# Patient Record
Sex: FEMALE | Race: WHITE | Employment: FULL TIME | URBAN - METROPOLITAN AREA
[De-identification: names, ages, dates, MRNs, and addresses within clinical notes are randomized per-mention and may not be internally consistent; named-entity substitution may affect disease eponyms.]

---

## 2017-01-09 ENCOUNTER — OFFICE VISIT (OUTPATIENT)
Dept: NUTRITION/OBESITY MEDICINE | Facility: HOSPITAL | Age: 62
End: 2017-01-09
Attending: FAMILY MEDICINE
Payer: COMMERCIAL

## 2017-01-09 VITALS
WEIGHT: 186.06 LBS | DIASTOLIC BLOOD PRESSURE: 85 MMHG | OXYGEN SATURATION: 95 % | HEIGHT: 63 IN | HEART RATE: 93 BPM | SYSTOLIC BLOOD PRESSURE: 131 MMHG | BODY MASS INDEX: 32.97 KG/M2 | RESPIRATION RATE: 18 BRPM

## 2017-01-09 DIAGNOSIS — G47.33 OSA ON CPAP: ICD-10-CM

## 2017-01-09 DIAGNOSIS — Z99.89 OSA ON CPAP: ICD-10-CM

## 2017-01-09 DIAGNOSIS — Z51.81 ENCOUNTER FOR THERAPEUTIC DRUG MONITORING: ICD-10-CM

## 2017-01-09 DIAGNOSIS — R73.03 PRE-DIABETES: ICD-10-CM

## 2017-01-09 DIAGNOSIS — R63.2 BINGE EATING: ICD-10-CM

## 2017-01-09 DIAGNOSIS — E66.9 OBESITY (BMI 30-39.9): ICD-10-CM

## 2017-01-09 DIAGNOSIS — E55.9 VITAMIN D DEFICIENCY: ICD-10-CM

## 2017-01-09 DIAGNOSIS — E78.2 MIXED HYPERCHOLESTEROLEMIA AND HYPERTRIGLYCERIDEMIA: Primary | ICD-10-CM

## 2017-01-09 PROCEDURE — 99214 OFFICE O/P EST MOD 30 MIN: CPT | Performed by: INTERNAL MEDICINE

## 2017-01-09 NOTE — PROGRESS NOTES
[facility] AND WEIGHT LOSS CLINIC  [address] [place] [address]  Dept: [phone]  Loc: [phone]     Date:   2016    Patient:  Cristina Underwood  :      1955  MRN:      [id number]    Chief C weight: 189    Wt Readings from Last 3 Encounters:  01/09/17 : 186 lb 1 oz (84.397 kg)  12/13/16 : 183 lb (83.008 kg)  12/06/16 : 183 lb 9.6 oz (83.28 kg)      Patient Medications:      Current Outpatient Prescriptions:  Lisdexamfetamine Dimesylate (VYVANS Types: Cigarettes    Quit date: 01/12/1987    Smokeless tobacco: Never Used    Alcohol Use: Yes    Comment: 5 week    Drug Use: No    Sexual Activity: Not on file   Not on file  Other Topics Concern    Caffeine Concern Yes     Social History Narrative has a Food Journal?: yes   · Patient is reading nutrition labels? yes  · Average Caloric Intake:     · Average CHO Intake: 100  · Is patient exercising? no  · Type of exercise?      Eating Habits  · Patient states the following:  · Eats 3 meal(s) per day atraumatic, no cyanosis or edema  Pulses: 2+ and symmetric    ASSESSMENT     OBSTRUCTIVE SLEEP APNEA: The patient states her sleep apnea has been stable since the last clinic visit. There has not been any increase in hyper-somnolence.      Elebeatriz Knight

## 2017-02-07 ENCOUNTER — OFFICE VISIT (OUTPATIENT)
Dept: SURGERY | Facility: CLINIC | Age: 62
End: 2017-02-07

## 2017-02-07 VITALS
RESPIRATION RATE: 16 BRPM | SYSTOLIC BLOOD PRESSURE: 142 MMHG | BODY MASS INDEX: 32.28 KG/M2 | DIASTOLIC BLOOD PRESSURE: 89 MMHG | WEIGHT: 182.19 LBS | HEART RATE: 97 BPM | HEIGHT: 63 IN

## 2017-02-07 DIAGNOSIS — G47.33 OSA ON CPAP: ICD-10-CM

## 2017-02-07 DIAGNOSIS — Z51.81 ENCOUNTER FOR THERAPEUTIC DRUG MONITORING: ICD-10-CM

## 2017-02-07 DIAGNOSIS — R63.2 BINGE EATING: ICD-10-CM

## 2017-02-07 DIAGNOSIS — E78.2 MIXED HYPERCHOLESTEROLEMIA AND HYPERTRIGLYCERIDEMIA: Primary | ICD-10-CM

## 2017-02-07 DIAGNOSIS — R73.03 PRE-DIABETES: ICD-10-CM

## 2017-02-07 DIAGNOSIS — E66.9 OBESITY (BMI 30-39.9): ICD-10-CM

## 2017-02-07 DIAGNOSIS — Z99.89 OSA ON CPAP: ICD-10-CM

## 2017-02-07 DIAGNOSIS — E55.9 VITAMIN D DEFICIENCY: ICD-10-CM

## 2017-02-07 PROCEDURE — 99213 OFFICE O/P EST LOW 20 MIN: CPT | Performed by: INTERNAL MEDICINE

## 2017-02-07 NOTE — PROGRESS NOTES
[facility] AND WEIGHT LOSS CLINIC  [address] [place] [address]  Dept: [phone]  Loc: [phone]     Date:   2016    Patient:  Jose Crawford  :      1955  MRN:      [id number]    Chief C lb 3.2 oz  01/24/17 : 186 lb  01/09/17 : 186 lb 1 oz      Patient Medications:      Current Outpatient Prescriptions:  Lisdexamfetamine Dimesylate (VYVANSE) 50 MG Oral Cap Take 1 capsule (50 mg total) by mouth every morning.  Disp: 30 capsule Rfl: 0   MONTE Concern Yes     Social History Narrative     Surgical History:        Past Surgical History    NASAL SCOPY,EXPLOR FRONTAL SINUS  10/15/09    Comment Performed by Timbo Silverio at [address] [place] [address]  10/15/09    Comme the following:  · Eats 3 meal(s) per day  · Length of time it takes to consume a meal:  20  · # of snacks per day: 1 Type of snacks:  Almonds, apple  · Amount of soda consumption per day:  diet  · Amount of water (in ounces) per day:  64  · Drinking betwee hyper-somnolence. HYPERCHOLESTEROLEMIA:  The patient states that her cholesterol has been well controlled on her current diet.       Lab Results  Component Value Date/Time   CHOLEST 239* 11/09/2016 09:21 AM   * 11/09/2016 09:21 AM   HDL 72 11/09/

## 2017-02-15 PROCEDURE — 84439 ASSAY OF FREE THYROXINE: CPT | Performed by: FAMILY MEDICINE

## 2017-02-15 PROCEDURE — 84443 ASSAY THYROID STIM HORMONE: CPT | Performed by: FAMILY MEDICINE

## 2017-02-15 PROCEDURE — 36415 COLL VENOUS BLD VENIPUNCTURE: CPT | Performed by: FAMILY MEDICINE

## 2017-02-15 PROCEDURE — 84480 ASSAY TRIIODOTHYRONINE (T3): CPT | Performed by: FAMILY MEDICINE

## 2017-03-09 ENCOUNTER — OFFICE VISIT (OUTPATIENT)
Dept: SURGERY | Facility: CLINIC | Age: 62
End: 2017-03-09

## 2017-03-09 VITALS
OXYGEN SATURATION: 95 % | SYSTOLIC BLOOD PRESSURE: 138 MMHG | HEART RATE: 94 BPM | DIASTOLIC BLOOD PRESSURE: 95 MMHG | RESPIRATION RATE: 16 BRPM | HEIGHT: 63.5 IN | BODY MASS INDEX: 31.05 KG/M2 | WEIGHT: 177.44 LBS

## 2017-03-09 DIAGNOSIS — Z51.81 ENCOUNTER FOR THERAPEUTIC DRUG MONITORING: ICD-10-CM

## 2017-03-09 DIAGNOSIS — G47.33 OSA ON CPAP: ICD-10-CM

## 2017-03-09 DIAGNOSIS — R73.03 PRE-DIABETES: ICD-10-CM

## 2017-03-09 DIAGNOSIS — E66.9 OBESITY (BMI 30-39.9): ICD-10-CM

## 2017-03-09 DIAGNOSIS — R63.2 BINGE EATING: ICD-10-CM

## 2017-03-09 DIAGNOSIS — E78.2 MIXED HYPERCHOLESTEROLEMIA AND HYPERTRIGLYCERIDEMIA: Primary | ICD-10-CM

## 2017-03-09 DIAGNOSIS — Z99.89 OSA ON CPAP: ICD-10-CM

## 2017-03-09 PROCEDURE — 99214 OFFICE O/P EST MOD 30 MIN: CPT | Performed by: INTERNAL MEDICINE

## 2017-03-09 RX ORDER — CYANOCOBALAMIN 500 UG/1
SPRAY NASAL
Refills: 4 | COMMUNITY
Start: 2017-02-23 | End: 2017-05-25 | Stop reason: ALTCHOICE

## 2017-03-09 NOTE — PROGRESS NOTES
[address] [place] [address] AND WEIGHT LOSS CLINIC  [address] [place] [address]  Dept: [phone]  Loc: [phone]     Date:   2016    Patient:  Claire Smith  :      1955  MRN:      [id number]    Chief C loss: -12   Start weight: 189    Wt Readings from Last 3 Encounters:  03/09/17 : 177 lb 7 oz  02/13/17 : 182 lb  02/13/17 : 182 lb 3.2 oz      Patient Medications:      Current Outpatient Prescriptions:  NASCOBAL 500 MCG/0.1ML Nasal Solution  Disp:  Rfl: 4 Social History Narrative     Surgical History:        Past Surgical History    NASAL SCOPY,EXPLOR FRONTAL SINUS  10/15/09    Comment Performed by Syed Cardoso at [address] [place] [address]  10/15/09    Comment Performed Habits  · Patient states the following:  · Eats 3 meal(s) per day  · Length of time it takes to consume a meal:  20  · # of snacks per day: 1 Type of snacks:  oranges  · Amount of soda consumption per day:  diet  · Amount of water (in ounces) per day:  64 been any increase in hyper-somnolence. HYPERCHOLESTEROLEMIA:  The patient states that her cholesterol has been well controlled on her current diet.       Lab Results  Component Value Date/Time   CHOLEST 239* 11/09/2016 09:21 AM   * 11/09/2016 09:

## 2017-05-02 ENCOUNTER — OFFICE VISIT (OUTPATIENT)
Dept: SURGERY | Facility: CLINIC | Age: 62
End: 2017-05-02

## 2017-05-02 VITALS
DIASTOLIC BLOOD PRESSURE: 96 MMHG | WEIGHT: 177.5 LBS | OXYGEN SATURATION: 97 % | SYSTOLIC BLOOD PRESSURE: 139 MMHG | HEART RATE: 90 BPM | BODY MASS INDEX: 31.06 KG/M2 | HEIGHT: 63.5 IN | RESPIRATION RATE: 18 BRPM

## 2017-05-02 DIAGNOSIS — R63.2 BINGE EATING: Primary | ICD-10-CM

## 2017-05-02 DIAGNOSIS — Z51.81 ENCOUNTER FOR THERAPEUTIC DRUG MONITORING: ICD-10-CM

## 2017-05-02 DIAGNOSIS — Z99.89 OSA ON CPAP: ICD-10-CM

## 2017-05-02 DIAGNOSIS — E78.2 MIXED HYPERCHOLESTEROLEMIA AND HYPERTRIGLYCERIDEMIA: ICD-10-CM

## 2017-05-02 DIAGNOSIS — E55.9 VITAMIN D DEFICIENCY: ICD-10-CM

## 2017-05-02 DIAGNOSIS — E66.9 OBESITY (BMI 30-39.9): ICD-10-CM

## 2017-05-02 DIAGNOSIS — R73.03 PRE-DIABETES: ICD-10-CM

## 2017-05-02 DIAGNOSIS — G47.33 OSA ON CPAP: ICD-10-CM

## 2017-05-02 PROCEDURE — 99214 OFFICE O/P EST MOD 30 MIN: CPT | Performed by: INTERNAL MEDICINE

## 2017-05-02 NOTE — PROGRESS NOTES
[address] [place] [address] AND WEIGHT LOSS CLINIC  [address] [place] [address]  Dept: [phone]  Loc: [phone]     Date:   2016    Patient:  Capri Jaimes  :      1955  MRN:      [id number]    Chief C loss: -12   Start weight: 189    Wt Readings from Last 3 Encounters:  05/02/17 : 177 lb 8 oz  04/06/17 : 176 lb  04/05/17 : 177 lb 6.4 oz      Patient Medications:      Current Outpatient Prescriptions:  [START ON 6/2/2017] Lisdexamfetamine Dimesylate (VYV Education: N/A  Number of Children: 3     Occupational History         Social History Main Topics   Smoking status: Former Smoker  1.50 Packs/Day  For 14.00 Years     Types: Cigarettes    Quit date: 01/12/1987    Smokeless tobacco: Never Use Asthma   • Hypertension Maternal Grandmother    • Hypertension Maternal Grandfather    • Thyroid Disorder Neg        Food Journal  · Reviewed and Discussed:       · Patient has a Food Journal?: yes   · Patient is reading nutrition labels?   yes  · Aver bilaterally  Heart: S1, S2 normal, no murmur, click, rub or gallop, regular rate and rhythm  Abdomen: soft, non-tender; bowel sounds normal; no masses,  no organomegaly  Extremities: extremities normal, atraumatic, no cyanosis or edema  Pulses: 2+ and symm mg    Bingeing improved with vyvanse    Follow up with therapist    Blood work done    Follow up with pcp as scheduled      Brown Lugo MD

## 2017-05-15 ENCOUNTER — TELEPHONE (OUTPATIENT)
Dept: SURGERY | Facility: CLINIC | Age: 62
End: 2017-05-15

## 2017-05-15 DIAGNOSIS — E66.9 OBESITY (BMI 30-39.9): Primary | ICD-10-CM

## 2017-05-15 NOTE — TELEPHONE ENCOUNTER
Kassandra Corbett stopped in today to pick-up new rx for vyvanse. She has been noticing a increase in hair loss since she started the medication. Please advise she contact phone# is [phone].

## 2017-06-06 ENCOUNTER — OFFICE VISIT (OUTPATIENT)
Dept: SURGERY | Facility: CLINIC | Age: 62
End: 2017-06-06

## 2017-06-06 VITALS
HEART RATE: 79 BPM | BODY MASS INDEX: 31.32 KG/M2 | DIASTOLIC BLOOD PRESSURE: 87 MMHG | RESPIRATION RATE: 18 BRPM | SYSTOLIC BLOOD PRESSURE: 150 MMHG | OXYGEN SATURATION: 96 % | HEIGHT: 63.5 IN | WEIGHT: 179 LBS

## 2017-06-06 DIAGNOSIS — G47.33 OSA ON CPAP: Primary | ICD-10-CM

## 2017-06-06 DIAGNOSIS — Z51.81 ENCOUNTER FOR THERAPEUTIC DRUG MONITORING: ICD-10-CM

## 2017-06-06 DIAGNOSIS — E78.2 MIXED HYPERCHOLESTEROLEMIA AND HYPERTRIGLYCERIDEMIA: ICD-10-CM

## 2017-06-06 DIAGNOSIS — Z99.89 OSA ON CPAP: Primary | ICD-10-CM

## 2017-06-06 DIAGNOSIS — R73.03 PRE-DIABETES: ICD-10-CM

## 2017-06-06 DIAGNOSIS — E66.9 OBESITY (BMI 30-39.9): ICD-10-CM

## 2017-06-06 PROCEDURE — 99214 OFFICE O/P EST MOD 30 MIN: CPT | Performed by: INTERNAL MEDICINE

## 2017-06-06 RX ORDER — PHENTERMINE HYDROCHLORIDE 15 MG/1
15 CAPSULE ORAL EVERY MORNING
Qty: 30 CAPSULE | Refills: 0 | Status: SHIPPED | OUTPATIENT
Start: 2017-06-06 | End: 2017-07-06 | Stop reason: ALTCHOICE

## 2017-06-06 RX ORDER — TOPIRAMATE 25 MG/1
25 TABLET ORAL EVERY EVENING
Qty: 30 TABLET | Refills: 1 | Status: SHIPPED | OUTPATIENT
Start: 2017-06-06 | End: 2017-07-06 | Stop reason: ALTCHOICE

## 2017-06-06 NOTE — PROGRESS NOTES
[facility] AND WEIGHT LOSS CLINIC  [address] [place] [address]  Dept: [phone]  Loc: [phone]     Date:   2016    Patient:  Iris Paulson  :      1955  MRN:      [id number]    Chief C weight: 189    Wt Readings from Last 3 Encounters:  06/06/17 : 179 lb  05/25/17 : 173 lb  05/02/17 : 177 lb 8 oz      Patient Medications:      Current Outpatient Prescriptions:  Citalopram Hydrobromide 20 MG Oral Tab TAKE 1 TABLET BY MOUTH ONCE DAILY.  Dis Comment Performed by Chon Lozano at [facility]  10/15/09    Comment Performed by Chon Lozano at [facility]  10/15/09    Comment Performed by Chon Lozano at  carbohydrates to 100 gms per day, Eat 100-200 calories within 1 hour of waking  and Eat 3-4 cups of fresh fruits or vegetables daily    Behavior Modifications Reviewed and Discussed  Eat breakfast, Eat 3 meals per day, Plan meals in advance, Read nutrition AM         Encounter Diagnosis(ses):   Estiven on cpap  ahi 25  (primary encounter diagnosis)  Mixed hypercholesterolemia and hypertriglyceridemia  Encounter for therapeutic drug monitoring  Pre-diabetes  Obesity (bmi 30-39. 9)    PLAN   No orders of the define

## 2017-08-02 PROBLEM — J45.40 EXTRINSIC ASTHMA, MODERATE PERSISTENT, UNCOMPLICATED: Status: ACTIVE | Noted: 2017-08-02

## 2017-08-02 PROBLEM — J45.40: Status: ACTIVE | Noted: 2017-08-02

## 2017-08-31 ENCOUNTER — OFFICE VISIT (OUTPATIENT)
Dept: SURGERY | Facility: CLINIC | Age: 62
End: 2017-08-31

## 2017-08-31 VITALS
DIASTOLIC BLOOD PRESSURE: 83 MMHG | SYSTOLIC BLOOD PRESSURE: 137 MMHG | HEIGHT: 63 IN | RESPIRATION RATE: 16 BRPM | BODY MASS INDEX: 31.57 KG/M2 | WEIGHT: 178.19 LBS | HEART RATE: 79 BPM

## 2017-08-31 DIAGNOSIS — R73.03 PRE-DIABETES: ICD-10-CM

## 2017-08-31 DIAGNOSIS — E66.9 OBESITY (BMI 30-39.9): ICD-10-CM

## 2017-08-31 DIAGNOSIS — E78.2 MIXED HYPERCHOLESTEROLEMIA AND HYPERTRIGLYCERIDEMIA: Primary | ICD-10-CM

## 2017-08-31 DIAGNOSIS — E55.9 VITAMIN D DEFICIENCY: ICD-10-CM

## 2017-08-31 PROCEDURE — 99214 OFFICE O/P EST MOD 30 MIN: CPT | Performed by: INTERNAL MEDICINE

## 2017-08-31 RX ORDER — AMLODIPINE BESYLATE 5 MG/1
5 TABLET ORAL
COMMUNITY
Start: 2017-06-06 | End: 2017-08-31

## 2017-08-31 RX ORDER — EPINEPHRINE 0.3 MG/.3ML
0.3 INJECTION SUBCUTANEOUS
COMMUNITY
End: 2017-08-31

## 2017-08-31 RX ORDER — MOMETASONE 50 UG/1
SPRAY, METERED NASAL
COMMUNITY
Start: 2015-07-28 | End: 2017-09-11 | Stop reason: ALTCHOICE

## 2017-08-31 RX ORDER — CITALOPRAM 20 MG/1
TABLET ORAL
COMMUNITY
Start: 2014-08-15 | End: 2017-08-31

## 2017-08-31 RX ORDER — FEXOFENADINE HCL 180 MG/1
180 TABLET ORAL
COMMUNITY
End: 2017-09-11

## 2017-08-31 NOTE — PROGRESS NOTES
[facility] AND WEIGHT LOSS CLINIC  [address] [place] [address]  Dept: [phone]  Loc: [phone]     Date:   2016    Patient:  Colt Johnson  :      1955  MRN:      [id number]    Chief C 3.2 oz  07/24/17 : 176 lb  07/06/17 : 178 lb      Patient Medications:      Current Outpatient Prescriptions:  Fexofenadine HCl 180 MG Oral Tab Take 180 mg by mouth.  Disp:  Rfl:    Mometasone Furoate (NASONEX) 50 MCG/ACT Nasal Suspension  Disp:  Rfl:    IP Never Used    Alcohol use Yes    Comment: 5 week    Drug use: No    Sexual activity: Not on file     Other Topics Concern    Caffeine Concern Yes     Social History Narrative   None on file     Surgical History:    Past Surgical History:  10/15/09: BRAIN S Thyroid Disorder Neg        Food Journal  · Reviewed and Discussed:       · Patient has a Food Journal?: yes   · Patient is reading nutrition labels? yes  · Average Caloric Intake:     · Average CHO Intake: 100  · Is patient exercising?  yes  · Type of exe soft, non-tender; bowel sounds normal; no masses,  no organomegaly  Extremities: extremities normal, atraumatic, no cyanosis or edema  Pulses: 2+ and symmetric    ASSESSMENT     OBSTRUCTIVE SLEEP APNEA: The patient states her sleep apnea has been stable si done    Follow up with pcp as scheduled      Isiah Hicks MD

## 2017-10-11 ENCOUNTER — TELEPHONE (OUTPATIENT)
Dept: SURGERY | Facility: CLINIC | Age: 62
End: 2017-10-11

## 2017-10-18 ENCOUNTER — OFFICE VISIT (OUTPATIENT)
Dept: SURGERY | Facility: CLINIC | Age: 62
End: 2017-10-18

## 2017-10-18 VITALS
HEART RATE: 83 BPM | SYSTOLIC BLOOD PRESSURE: 134 MMHG | WEIGHT: 176.5 LBS | OXYGEN SATURATION: 96 % | HEIGHT: 63 IN | DIASTOLIC BLOOD PRESSURE: 85 MMHG | BODY MASS INDEX: 31.27 KG/M2 | RESPIRATION RATE: 16 BRPM

## 2017-10-18 DIAGNOSIS — R73.03 PRE-DIABETES: ICD-10-CM

## 2017-10-18 DIAGNOSIS — G47.33 OSA ON CPAP: ICD-10-CM

## 2017-10-18 DIAGNOSIS — E78.2 MIXED HYPERCHOLESTEROLEMIA AND HYPERTRIGLYCERIDEMIA: Primary | ICD-10-CM

## 2017-10-18 DIAGNOSIS — E66.9 OBESITY (BMI 30-39.9): ICD-10-CM

## 2017-10-18 DIAGNOSIS — Z99.89 OSA ON CPAP: ICD-10-CM

## 2017-10-18 DIAGNOSIS — E55.9 VITAMIN D DEFICIENCY: ICD-10-CM

## 2017-10-18 PROCEDURE — 99214 OFFICE O/P EST MOD 30 MIN: CPT | Performed by: INTERNAL MEDICINE

## 2017-10-18 RX ORDER — LISDEXAMFETAMINE DIMESYLATE 30 MG/1
CAPSULE ORAL
Refills: 0 | COMMUNITY
Start: 2017-09-19 | End: 2018-02-01

## 2017-10-18 NOTE — PROGRESS NOTES
[address] [place] [address] AND WEIGHT LOSS CLINIC  [address] [place] [address]  Dept: [phone]  Loc: [phone]     Date:   2016    Patient:  Robcherie Merlos  :      1955  MRN:      [id number]    Chief C -15   Start weight: 189    Wt Readings from Last 3 Encounters:  10/18/17 : 176 lb 8 oz  10/13/17 : 175 lb  09/11/17 : 179 lb      Patient Medications:      Current Outpatient Prescriptions:  VYVANSE 30 MG Oral Cap TK ONE C PO D Disp:  Rfl: 0   SYMBICORT 16 Sexual activity: Not on file     Other Topics Concern    Caffeine Concern Yes     Social History Narrative   None on file     Surgical History:    Past Surgical History:  10/15/09: BRAIN SURGERY USING COMPUTER      Comment: Performed by Chantal Monroy at [facility] Food Journal?: yes   · Patient is reading nutrition labels? yes  · Average Caloric Intake:     · Average CHO Intake: 100  · Is patient exercising?  yes  · Type of exercise? treadmill four days a week    Eating Habits  · Patient states the following:  · Eat extremities normal, atraumatic, no cyanosis or edema  Pulses: 2+ and symmetric    ASSESSMENT     OBSTRUCTIVE SLEEP APNEA: The patient states her sleep apnea has been stable since the last clinic visit. There has not been any increase in hyper-somnolence. night    Does not want to start saxenda due to side effects    Blood work done    Follow up with pcp as scheduled      Enmanuel Rodney MD

## 2018-02-01 ENCOUNTER — OFFICE VISIT (OUTPATIENT)
Dept: SURGERY | Facility: CLINIC | Age: 63
End: 2018-02-01

## 2018-02-01 VITALS
SYSTOLIC BLOOD PRESSURE: 138 MMHG | OXYGEN SATURATION: 96 % | WEIGHT: 184 LBS | RESPIRATION RATE: 16 BRPM | DIASTOLIC BLOOD PRESSURE: 88 MMHG | BODY MASS INDEX: 32.6 KG/M2 | HEIGHT: 63 IN | HEART RATE: 95 BPM

## 2018-02-01 DIAGNOSIS — G47.33 OSA ON CPAP: ICD-10-CM

## 2018-02-01 DIAGNOSIS — E66.9 OBESITY (BMI 30-39.9): ICD-10-CM

## 2018-02-01 DIAGNOSIS — Z99.89 OSA ON CPAP: ICD-10-CM

## 2018-02-01 DIAGNOSIS — R73.03 PRE-DIABETES: Primary | ICD-10-CM

## 2018-02-01 DIAGNOSIS — E78.2 MIXED HYPERCHOLESTEROLEMIA AND HYPERTRIGLYCERIDEMIA: ICD-10-CM

## 2018-02-01 PROCEDURE — 99214 OFFICE O/P EST MOD 30 MIN: CPT | Performed by: INTERNAL MEDICINE

## 2018-02-01 RX ORDER — TOPIRAMATE 25 MG/1
25 TABLET ORAL 2 TIMES DAILY
Qty: 60 TABLET | Refills: 1 | Status: SHIPPED | OUTPATIENT
Start: 2018-02-01 | End: 2018-05-16 | Stop reason: ALTCHOICE

## 2018-02-01 NOTE — PROGRESS NOTES
[address] [place] [address] AND WEIGHT LOSS CLINIC  [address] [place] [address]  Dept: [phone]  Loc: [phone]     Date:   2016    Patient:  Mindy Conner  :      1955  MRN:      [id number]    Chief C 180 lb  10/18/17 : 176 lb 8 oz      Patient Medications:      Current Outpatient Prescriptions:  Ipratropium Bromide 0.06 % Nasal Solution 2 sprays by Nasal route 3 (three) times daily as needed for Rhinitis.  Disp: 1 Bottle Rfl: 0   azithromycin (ZITHROMAX EVERY FOUR WEEKS Disp: 2 each Rfl: 6   ALLEGRA 180 MG OR TABS 1 po daily PRN Disp:  Rfl:    AEROCHAMBER PLUS MISC as directed Disp: 1 Rfl: 1     Allergies:  Mold; Dander     Social History:      Social History  Social History   Marital status:   Spo HISTORY      Comment: sinus surgeries  Family History:    Family History   Problem Relation Age of Onset   • Heart Disorder Father      CAD with MI   • Diabetes Father      Type 2 DM   • Obesity Father    • Other [OTHER] Father    • Hypertension Mother negative  Behavioral/Psych: positive for stress  Endocrine: negative  All other systems were reviewed and are negative    Physical Exam:   General appearance: alert, appears stated age and cooperative  Head: Normocephalic, without obvious abnormality, atra low carb diet    Binge eating: not taking Vyvanse    Goals for next month:  1. Keep a food log. 2. Drink 48-64 ounces of non-caloric beverages per day. No fruit juices or regular soda. 3. Increase activity-upper body exercises, walk 10 minutes per day.

## 2018-02-12 PROCEDURE — 85048 AUTOMATED LEUKOCYTE COUNT: CPT | Performed by: INTERNAL MEDICINE

## 2018-02-12 PROCEDURE — 36415 COLL VENOUS BLD VENIPUNCTURE: CPT | Performed by: INTERNAL MEDICINE

## 2018-02-12 PROCEDURE — 82785 ASSAY OF IGE: CPT | Performed by: INTERNAL MEDICINE

## 2018-03-16 PROBLEM — J33.9 NASAL POLYPOSIS: Status: ACTIVE | Noted: 2018-03-16

## 2018-07-30 PROBLEM — M19.079 ARTHRITIS OF FOOT: Status: ACTIVE | Noted: 2018-07-30

## 2019-05-13 PROCEDURE — 87205 SMEAR GRAM STAIN: CPT | Performed by: OTOLARYNGOLOGY

## 2019-05-13 PROCEDURE — 36415 COLL VENOUS BLD VENIPUNCTURE: CPT | Performed by: INTERNAL MEDICINE

## 2019-05-13 PROCEDURE — 87077 CULTURE AEROBIC IDENTIFY: CPT | Performed by: OTOLARYNGOLOGY

## 2019-05-13 PROCEDURE — 87070 CULTURE OTHR SPECIMN AEROBIC: CPT | Performed by: OTOLARYNGOLOGY

## 2019-05-13 PROCEDURE — 85048 AUTOMATED LEUKOCYTE COUNT: CPT | Performed by: INTERNAL MEDICINE

## 2019-12-31 ENCOUNTER — TELEPHONE (OUTPATIENT)
Dept: SURGERY | Facility: CLINIC | Age: 64
End: 2019-12-31

## 2020-01-09 ENCOUNTER — OFFICE VISIT (OUTPATIENT)
Dept: SURGERY | Facility: CLINIC | Age: 65
End: 2020-01-09
Payer: COMMERCIAL

## 2020-01-09 VITALS
OXYGEN SATURATION: 98 % | HEART RATE: 98 BPM | SYSTOLIC BLOOD PRESSURE: 142 MMHG | BODY MASS INDEX: 33.84 KG/M2 | RESPIRATION RATE: 16 BRPM | WEIGHT: 191 LBS | HEIGHT: 63 IN | DIASTOLIC BLOOD PRESSURE: 86 MMHG

## 2020-01-09 DIAGNOSIS — E78.2 MIXED HYPERCHOLESTEROLEMIA AND HYPERTRIGLYCERIDEMIA: Primary | ICD-10-CM

## 2020-01-09 DIAGNOSIS — R73.03 PRE-DIABETES: ICD-10-CM

## 2020-01-09 DIAGNOSIS — E66.9 OBESITY (BMI 30-39.9): ICD-10-CM

## 2020-01-09 PROCEDURE — 99214 OFFICE O/P EST MOD 30 MIN: CPT | Performed by: INTERNAL MEDICINE

## 2020-01-09 RX ORDER — PHENTERMINE HYDROCHLORIDE 15 MG/1
15 CAPSULE ORAL EVERY MORNING
Qty: 30 CAPSULE | Refills: 2 | Status: SHIPPED | OUTPATIENT
Start: 2020-01-09 | End: 2020-03-02 | Stop reason: DRUGHIGH

## 2020-01-09 NOTE — PROGRESS NOTES
[address]  [facility] [address] [address]  Dept: [address]: [phone]       Patient:  Idalmis Courtland  :      1955  MRN:      [id number]    Chief Complaint:  Patient lb (81.6 kg)      Patient Medications:    Current Outpatient Medications   Medication Sig Dispense Refill   • hydrochlorothiazide 12.5 MG Oral Cap Take 1 capsule (12.5 mg total) by mouth once daily.  90 capsule 1   • AMLODIPINE BESYLATE 5 MG Oral Tab TAKE 1 status: Former Smoker        Packs/day: 1.50        Years: 14.00        Pack years: 21        Types: Cigarettes        Quit date: 1987        Years since quittin.0      Smokeless tobacco: Never Used    Substance and Sexual Activity      Alcohol u Type 2 DM   • Obesity Father    • Other (Other) Father    • Hypertension Mother    • Other (Other) Mother    • Diabetes Sister         Type 2 DM   • Hypertension Sister    • Obesity Sister    • Other (Other) Sister    • Other (Other) Daughter age and cooperative  Head: Normocephalic, without obvious abnormality, atraumatic  Eyes: conjunctivae/corneas clear. PERRL, EOM's intact. Fundi benign. Back: symmetric, no curvature. ROM normal. No CVA tenderness.   Lungs: clear to auscultation bilaterally Increase activity-upper body exercises, walk 10 minutes per day. 4. Increase fruit and vegetable servings to 5-6 per day.       Stopped Vyvanse due to hair loss    Increased sugar cravings at night    Does not want to start saxenda due to side effects    W

## 2020-03-02 ENCOUNTER — OFFICE VISIT (OUTPATIENT)
Dept: SURGERY | Facility: CLINIC | Age: 65
End: 2020-03-02
Payer: COMMERCIAL

## 2020-03-02 VITALS
DIASTOLIC BLOOD PRESSURE: 92 MMHG | HEIGHT: 63 IN | HEART RATE: 90 BPM | BODY MASS INDEX: 34.2 KG/M2 | SYSTOLIC BLOOD PRESSURE: 150 MMHG | WEIGHT: 193 LBS

## 2020-03-02 DIAGNOSIS — E78.2 MIXED HYPERCHOLESTEROLEMIA AND HYPERTRIGLYCERIDEMIA: Primary | ICD-10-CM

## 2020-03-02 DIAGNOSIS — R73.03 PRE-DIABETES: ICD-10-CM

## 2020-03-02 DIAGNOSIS — E66.9 OBESITY (BMI 30-39.9): ICD-10-CM

## 2020-03-02 PROCEDURE — 99214 OFFICE O/P EST MOD 30 MIN: CPT | Performed by: INTERNAL MEDICINE

## 2020-03-02 RX ORDER — PHENTERMINE HYDROCHLORIDE 30 MG/1
30 CAPSULE ORAL EVERY MORNING
Qty: 30 CAPSULE | Refills: 2 | Status: SHIPPED | OUTPATIENT
Start: 2020-03-02 | End: 2020-09-24 | Stop reason: ALTCHOICE

## 2020-03-02 NOTE — PROGRESS NOTES
[address]  [facility] 92. [address] [facility] [address]  Dept: [address]: [phone]       Patient:  Mi Mart  :      1955  MRN:      [id number]    Chief Complaint:  Patient kg)  01/09/20 : 191 lb (86.6 kg)      Patient Medications:    Current Outpatient Medications   Medication Sig Dispense Refill   • Cefuroxime Axetil 500 MG Oral Tab Take 1 tablet (500 mg total) by mouth 2 (two) times daily for 10 days.  20 tablet 0   • predn each 6   • ALLEGRA 180 MG OR TABS 1 po daily PRN     • AEROCHAMBER PLUS MISC as directed 1 1     Allergies:  Mold; Dander     Social History:    Social History    Socioeconomic History      Marital status:       Spouse name: Not on file      Number Not Asked        Special Diet: Not Asked        Back Care: Not Asked        Exercise: Not Asked        Bike Helmet: Not Asked        Seat Belt: Not Asked        Self-Exams: Not Asked    Social History Narrative      Not on file    Surgical History:    Past day, Maintain a daily food journal, No drinking 30 minutes before or after meals, Utlize portion control strategies to reduce calorie intake, Identify triggers for eating and manage cues and Eat slowly and take 20 to 30 minutes to complete each meal    Exe surgery. Patient desires to pursue traditional weight loss at this time. Patient was instructed to continue wearing their CPaP as recommended. DYSLIPIDEMIA: Stable on the above prescribed meal plan . Liver function stable.     Lab Results   Compone

## 2020-04-29 ENCOUNTER — LAB ENCOUNTER (OUTPATIENT)
Dept: LAB | Age: 65
End: 2020-04-29
Attending: DERMATOLOGY
Payer: COMMERCIAL

## 2020-04-29 DIAGNOSIS — C44.92 SCC (SQUAMOUS CELL CARCINOMA): Primary | ICD-10-CM

## 2020-04-29 PROCEDURE — 88305 TISSUE EXAM BY PATHOLOGIST: CPT

## 2020-05-07 NOTE — PROGRESS NOTES
Benign pathology results. Wart. Good news! No further treatment is necessary unless the lesion regrows or recurs. Nanci Aguila MD

## 2020-06-01 ENCOUNTER — OFFICE VISIT (OUTPATIENT)
Dept: SURGERY | Facility: CLINIC | Age: 65
End: 2020-06-01
Payer: COMMERCIAL

## 2020-06-01 VITALS — BODY MASS INDEX: 32.78 KG/M2 | HEIGHT: 63 IN | WEIGHT: 185 LBS

## 2020-06-01 DIAGNOSIS — E55.9 VITAMIN D DEFICIENCY: ICD-10-CM

## 2020-06-01 DIAGNOSIS — R73.03 PRE-DIABETES: Primary | ICD-10-CM

## 2020-06-01 DIAGNOSIS — E66.9 OBESITY (BMI 30-39.9): ICD-10-CM

## 2020-06-01 DIAGNOSIS — E78.2 MIXED HYPERCHOLESTEROLEMIA AND HYPERTRIGLYCERIDEMIA: ICD-10-CM

## 2020-06-01 PROCEDURE — 99213 OFFICE O/P EST LOW 20 MIN: CPT | Performed by: INTERNAL MEDICINE

## 2020-06-01 NOTE — PROGRESS NOTES
[facility] AND WEIGHT LOSS CLINIC  [address] [facility] [address]  Dept: [phone]  Loc: [phone]     Virtual Telephone Check-In    Wilmer Sales verbally consents to a Virtual/Telephone Check-In vis Comorbidities:  Asthma-Improvement?  yes, SOB/RIDDLE-Improvement?  yes, Hypertension-Improvement?   yes and PATRICIA-Improvement?  yes    OBJECTIVE     Vitals: Ht 5' 3\" (1.6 m)   Wt 185 lb (83.9 kg)   BMI 32.77 kg/m²     Initial weight loss: -08   Total weight Soln ADMINISTER 300MG SUBCUTANEOUS EVERY FOUR WEEKS 2 each 6   • ALLEGRA 180 MG OR TABS 1 po daily PRN     • AEROCHAMBER PLUS MISC as directed 1 1     Allergies:  Mold; Dander     Social History:    Social History    Socioeconomic History      Marital stat Stress Concern: Not Asked        Weight Concern: Not Asked        Special Diet: Not Asked        Back Care: Not Asked        Exercise: Not Asked        Bike Helmet: Not Asked        Seat Belt: Not Asked        Self-Exams: Not Asked    Social History Narr Read nutrition labels, Drink 64 oz of water per day, Maintain a daily food journal, No drinking 30 minutes before or after meals, Utlize portion control strategies to reduce calorie intake, Identify triggers for eating and manage cues and Eat slowly and ta 08:49 AM    VLDL 22 04/15/2010 08:58 AM       Pre diabetes: continue low carb diet    Binge eating: not taking Vyvanse    Goals for next month:  1. Keep a food log. 2. Drink 48-64 ounces of non-caloric beverages per day. No fruit juices or regular soda.

## 2021-02-20 ENCOUNTER — LAB ENCOUNTER (OUTPATIENT)
Dept: LAB | Age: 66
End: 2021-02-20
Attending: INTERNAL MEDICINE
Payer: COMMERCIAL

## 2021-02-20 DIAGNOSIS — Z01.818 PRE-OP TESTING: ICD-10-CM

## 2021-02-20 LAB — SARS-COV-2 RNA RESP QL NAA+PROBE: NOT DETECTED

## 2021-02-23 PROBLEM — K64.8 INTERNAL HEMORRHOIDS WITHOUT COMPLICATION: Status: ACTIVE | Noted: 2021-02-23

## 2021-02-23 PROBLEM — K57.30 DIVERTICULOSIS OF COLON WITHOUT HEMORRHAGE: Status: ACTIVE | Noted: 2021-02-23

## 2021-02-23 PROBLEM — K63.5 COLON POLYP: Status: ACTIVE | Noted: 2021-02-23

## 2021-02-23 PROBLEM — Z86.010 PERSONAL HISTORY OF COLONIC POLYPS: Status: ACTIVE | Noted: 2021-02-23

## 2021-02-23 PROBLEM — Z86.0100 PERSONAL HISTORY OF COLONIC POLYPS: Status: ACTIVE | Noted: 2021-02-23

## 2021-09-07 ENCOUNTER — OFFICE VISIT (OUTPATIENT)
Dept: SURGERY | Facility: CLINIC | Age: 66
End: 2021-09-07
Payer: COMMERCIAL

## 2021-09-07 VITALS — WEIGHT: 189.38 LBS | BODY MASS INDEX: 33.55 KG/M2 | HEIGHT: 63 IN

## 2021-09-07 DIAGNOSIS — E55.9 VITAMIN D DEFICIENCY: ICD-10-CM

## 2021-09-07 DIAGNOSIS — E78.2 MIXED HYPERCHOLESTEROLEMIA AND HYPERTRIGLYCERIDEMIA: ICD-10-CM

## 2021-09-07 DIAGNOSIS — E66.9 OBESITY (BMI 30-39.9): ICD-10-CM

## 2021-09-07 DIAGNOSIS — R73.03 PRE-DIABETES: Primary | ICD-10-CM

## 2021-09-07 PROCEDURE — 3008F BODY MASS INDEX DOCD: CPT | Performed by: INTERNAL MEDICINE

## 2021-09-07 PROCEDURE — 99214 OFFICE O/P EST MOD 30 MIN: CPT | Performed by: INTERNAL MEDICINE

## 2021-09-07 NOTE — PROGRESS NOTES
[address]  [facility] [address]  Dept: [address]: [phone]       Patient:  Kimberly Sandoval  :      1955  MRN:      [id number]    Chief Complaint:  Patient kg)  08/02/21 : 180 lb (81.6 kg)      Patient Medications:    Current Outpatient Medications   Medication Sig Dispense Refill   • citalopram 20 MG Oral Tab Take 1 tablet (20 mg total) by mouth daily.  90 tablet 0   • amLODIPine 10 MG Oral Tab Take 1 tablet 1973        Quit date: 1987        Years since quittin.6      Smokeless tobacco: Never Used    Substance and Sexual Activity      Alcohol use:  Yes        Alcohol/week: 4.0 standard drinks        Types: 4 Glasses of wine per week        Comme TURBINATE,SUBMUCOUS  10/15/09    Performed by Caro Chen at [address] [facility] [address]  10/15/09    Performed by Caro Chen at [facility]   • NASAL SCOPY,EXPLOR FRONTAL SINUS  10/15/09    Performed by Julio Miller 64  · Drinking between meals only:  yes  · Toughest challenge:      Nutritional Goals  Limit carbohydrates to 100 gms per day, Eat 100-200 calories within 1 hour of waking  and Eat 3-4 cups of fresh fruits or vegetables daily    Behavior Modifications Revi 08/27/2021 10:02 AM    HDL 65 08/27/2021 10:02 AM    TRIG 231.00 (H) 08/27/2021 10:02 AM    VLDL 46 (H) 08/27/2021 10:02 AM         Encounter Diagnosis(ses):   Pre-diabetes  (primary encounter diagnosis)  Mixed hypercholesterolemia and hypertriglyceridemia

## 2021-09-08 ENCOUNTER — TELEPHONE (OUTPATIENT)
Dept: SURGERY | Facility: CLINIC | Age: 66
End: 2021-09-08

## 2021-09-08 RX ORDER — TOPIRAMATE 25 MG/1
25 TABLET ORAL EVERY EVENING
Qty: 30 TABLET | Refills: 2 | Status: SHIPPED | OUTPATIENT
Start: 2021-09-08 | End: 2021-11-02

## 2021-09-08 NOTE — TELEPHONE ENCOUNTER
Patient stated you had stated two different scripts were going to be sent to her pharmacy. I only see you sent in topiramate

## 2021-09-09 DIAGNOSIS — E66.9 OBESITY (BMI 30-39.9): Primary | ICD-10-CM

## 2021-09-09 RX ORDER — PHENTERMINE HYDROCHLORIDE 15 MG/1
15 CAPSULE ORAL EVERY MORNING
Qty: 30 CAPSULE | Refills: 2 | Status: SHIPPED | OUTPATIENT
Start: 2021-09-09 | End: 2021-11-02

## 2021-11-02 ENCOUNTER — OFFICE VISIT (OUTPATIENT)
Dept: SURGERY | Facility: CLINIC | Age: 66
End: 2021-11-02
Payer: COMMERCIAL

## 2021-11-02 VITALS
BODY MASS INDEX: 33.37 KG/M2 | OXYGEN SATURATION: 96 % | DIASTOLIC BLOOD PRESSURE: 76 MMHG | HEART RATE: 79 BPM | HEIGHT: 63 IN | WEIGHT: 188.31 LBS | SYSTOLIC BLOOD PRESSURE: 136 MMHG

## 2021-11-02 DIAGNOSIS — Z51.81 ENCOUNTER FOR THERAPEUTIC DRUG MONITORING: ICD-10-CM

## 2021-11-02 DIAGNOSIS — R73.03 PRE-DIABETES: Primary | ICD-10-CM

## 2021-11-02 DIAGNOSIS — E78.2 MIXED HYPERCHOLESTEROLEMIA AND HYPERTRIGLYCERIDEMIA: ICD-10-CM

## 2021-11-02 DIAGNOSIS — E66.9 OBESITY (BMI 30-39.9): ICD-10-CM

## 2021-11-02 PROCEDURE — 3078F DIAST BP <80 MM HG: CPT | Performed by: INTERNAL MEDICINE

## 2021-11-02 PROCEDURE — 3075F SYST BP GE 130 - 139MM HG: CPT | Performed by: INTERNAL MEDICINE

## 2021-11-02 PROCEDURE — 99214 OFFICE O/P EST MOD 30 MIN: CPT | Performed by: INTERNAL MEDICINE

## 2021-11-02 PROCEDURE — 3008F BODY MASS INDEX DOCD: CPT | Performed by: INTERNAL MEDICINE

## 2021-11-02 RX ORDER — PHENTERMINE HYDROCHLORIDE 15 MG/1
15 CAPSULE ORAL EVERY MORNING
Qty: 30 CAPSULE | Refills: 2 | Status: SHIPPED | OUTPATIENT
Start: 2021-11-02 | End: 2021-11-18 | Stop reason: DRUGHIGH

## 2021-11-02 RX ORDER — TOPIRAMATE 25 MG/1
25 TABLET ORAL EVERY EVENING
Qty: 30 TABLET | Refills: 2 | Status: SHIPPED | OUTPATIENT
Start: 2021-11-02

## 2021-11-02 NOTE — PROGRESS NOTES
[address]  [facility] [address]. [address] [facility] [address]  Dept: [address]: [phone]       Patient:  Michelle White  :      1955  MRN:      [id number]    Chief Complaint:  Patient Readings from Last 3 Encounters:  11/02/21 : 188 lb 4.8 oz (85.4 kg)  09/07/21 : 189 lb 6.4 oz (85.9 kg)  08/25/21 : 189 lb (85.7 kg)      Patient Medications:    Current Outpatient Medications   Medication Sig Dispense Refill   • cefdinir 300 MG Oral Cap Inhaler 3   • EPINEPHrine (EPIPEN 2-LANCE) 0.3 MG/0.3ML Injection Solution Auto-injector Take as directed--mylan generic preferred if available 2 each 0   • ALLEGRA 180 MG OR TABS 1 po daily PRN       Allergies:  Mold, Dander, and Macrobid [Nitrofurantoin] Days of Exercise per Week: Not on file      Minutes of Exercise per Session: Not on file  Stress:       Feeling of Stress : Not on file  Social Connections:       Frequency of Communication with Friends and Family: Not on file      Frequency of Social Jovanny Rowan Family History:    Family History   Problem Relation Age of Onset   • Heart Disorder Father         CAD with MI   • Diabetes Father         Type 2 DM   • Obesity Father    • Other (Other) Father    • Hypertension Mother    • Other (Other) Mother    • D negative  Behavioral/Psych: positive for stress  Endocrine: negative  All other systems were reviewed and are negative    Physical Exam:   General appearance: alert, appears stated age and cooperative  Head: Normocephalic, without obvious abnormality, atra 08/27/2021 10:02 AM       Pre diabetes: continue low carb diet    Binge eating: not taking Vyvanse    Goals for next month:  1. Keep a food log. 2. Drink 48-64 ounces of non-caloric beverages per day. No fruit juices or regular soda.   3. Increase activity

## 2021-11-18 DIAGNOSIS — E66.9 OBESITY (BMI 30-39.9): Primary | ICD-10-CM

## 2021-11-18 RX ORDER — PHENTERMINE HYDROCHLORIDE 8 MG/1
1 TABLET ORAL DAILY
Qty: 30 TABLET | Refills: 1 | Status: SHIPPED | OUTPATIENT
Start: 2021-11-18 | End: 2021-12-18

## 2022-02-15 ENCOUNTER — OFFICE VISIT (OUTPATIENT)
Dept: SURGERY | Facility: CLINIC | Age: 67
End: 2022-02-15
Payer: COMMERCIAL

## 2022-02-15 VITALS
SYSTOLIC BLOOD PRESSURE: 130 MMHG | WEIGHT: 188.63 LBS | HEIGHT: 63 IN | HEART RATE: 93 BPM | BODY MASS INDEX: 33.42 KG/M2 | OXYGEN SATURATION: 96 % | DIASTOLIC BLOOD PRESSURE: 75 MMHG

## 2022-02-15 DIAGNOSIS — R73.03 PRE-DIABETES: Primary | ICD-10-CM

## 2022-02-15 DIAGNOSIS — Z51.81 ENCOUNTER FOR THERAPEUTIC DRUG MONITORING: ICD-10-CM

## 2022-02-15 DIAGNOSIS — E78.2 MIXED HYPERCHOLESTEROLEMIA AND HYPERTRIGLYCERIDEMIA: ICD-10-CM

## 2022-02-15 DIAGNOSIS — E66.9 OBESITY (BMI 30-39.9): ICD-10-CM

## 2022-02-15 PROCEDURE — 3075F SYST BP GE 130 - 139MM HG: CPT | Performed by: INTERNAL MEDICINE

## 2022-02-15 PROCEDURE — 3078F DIAST BP <80 MM HG: CPT | Performed by: INTERNAL MEDICINE

## 2022-02-15 PROCEDURE — 3008F BODY MASS INDEX DOCD: CPT | Performed by: INTERNAL MEDICINE

## 2022-02-15 PROCEDURE — 99214 OFFICE O/P EST MOD 30 MIN: CPT | Performed by: INTERNAL MEDICINE

## 2022-02-15 RX ORDER — LIRAGLUTIDE 6 MG/ML
INJECTION, SOLUTION SUBCUTANEOUS
Qty: 3 ML | Refills: 1 | Status: SHIPPED | OUTPATIENT
Start: 2022-02-15 | End: 2022-04-04 | Stop reason: ALTCHOICE

## 2022-02-15 RX ORDER — PEN NEEDLE, DIABETIC 30 GX3/16"
1 NEEDLE, DISPOSABLE MISCELLANEOUS DAILY
Qty: 90 EACH | Refills: 0 | Status: SHIPPED | OUTPATIENT
Start: 2022-02-15 | End: 2022-04-04 | Stop reason: ALTCHOICE

## 2022-11-11 ENCOUNTER — OFFICE VISIT (OUTPATIENT)
Dept: SURGERY | Facility: CLINIC | Age: 67
End: 2022-11-11
Payer: COMMERCIAL

## 2022-11-11 VITALS
HEART RATE: 80 BPM | WEIGHT: 192.63 LBS | HEIGHT: 63 IN | SYSTOLIC BLOOD PRESSURE: 136 MMHG | BODY MASS INDEX: 34.13 KG/M2 | DIASTOLIC BLOOD PRESSURE: 81 MMHG | OXYGEN SATURATION: 96 %

## 2022-11-11 DIAGNOSIS — R73.03 PRE-DIABETES: Primary | ICD-10-CM

## 2022-11-11 DIAGNOSIS — E66.9 OBESITY (BMI 30-39.9): ICD-10-CM

## 2022-11-11 DIAGNOSIS — Z51.81 ENCOUNTER FOR THERAPEUTIC DRUG MONITORING: ICD-10-CM

## 2022-11-11 DIAGNOSIS — E78.2 MIXED HYPERCHOLESTEROLEMIA AND HYPERTRIGLYCERIDEMIA: ICD-10-CM

## 2022-11-11 PROCEDURE — 99214 OFFICE O/P EST MOD 30 MIN: CPT | Performed by: INTERNAL MEDICINE

## 2022-11-11 PROCEDURE — 3075F SYST BP GE 130 - 139MM HG: CPT | Performed by: INTERNAL MEDICINE

## 2022-11-11 PROCEDURE — 3008F BODY MASS INDEX DOCD: CPT | Performed by: INTERNAL MEDICINE

## 2022-11-11 PROCEDURE — 3079F DIAST BP 80-89 MM HG: CPT | Performed by: INTERNAL MEDICINE

## 2022-11-11 RX ORDER — PHENTERMINE HYDROCHLORIDE 8 MG/1
1 TABLET ORAL DAILY
Qty: 30 TABLET | Refills: 2 | Status: SHIPPED | OUTPATIENT
Start: 2022-11-11 | End: 2022-12-11

## 2023-02-01 ENCOUNTER — HOSPITAL ENCOUNTER (EMERGENCY)
Age: 68
Discharge: HOME | End: 2023-02-01
Attending: EMERGENCY MEDICINE
Payer: MEDICARE

## 2023-02-01 VITALS
RESPIRATION RATE: 20 BRPM | WEIGHT: 185 LBS | HEART RATE: 92 BPM | TEMPERATURE: 98 F | SYSTOLIC BLOOD PRESSURE: 135 MMHG | HEIGHT: 63 IN | BODY MASS INDEX: 32.78 KG/M2 | OXYGEN SATURATION: 93 % | DIASTOLIC BLOOD PRESSURE: 57 MMHG

## 2023-02-01 DIAGNOSIS — J45.909 ASTHMA WITH BRONCHITIS: Primary | ICD-10-CM

## 2023-02-01 LAB
POCT INFLUENZA A: NEGATIVE
POCT INFLUENZA B: NEGATIVE
SARS-COV-2 RNA RESP QL NAA+PROBE: NOT DETECTED

## 2023-02-01 PROCEDURE — 99284 EMERGENCY DEPT VISIT MOD MDM: CPT

## 2023-02-01 PROCEDURE — 87502 INFLUENZA DNA AMP PROBE: CPT | Performed by: EMERGENCY MEDICINE

## 2023-02-01 PROCEDURE — 94640 AIRWAY INHALATION TREATMENT: CPT

## 2023-02-01 RX ORDER — BENZONATATE 200 MG/1
200 CAPSULE ORAL 3 TIMES DAILY PRN
Qty: 30 CAPSULE | Refills: 0 | Status: SHIPPED | OUTPATIENT
Start: 2023-02-01 | End: 2023-03-03

## 2023-02-01 RX ORDER — IPRATROPIUM BROMIDE AND ALBUTEROL SULFATE 2.5; .5 MG/3ML; MG/3ML
3 SOLUTION RESPIRATORY (INHALATION) ONCE
Status: COMPLETED | OUTPATIENT
Start: 2023-02-01 | End: 2023-02-01

## 2024-07-29 ENCOUNTER — OFFICE VISIT (OUTPATIENT)
Dept: SURGERY | Facility: CLINIC | Age: 69
End: 2024-07-29
Payer: MEDICARE

## 2024-07-29 VITALS
WEIGHT: 192.31 LBS | DIASTOLIC BLOOD PRESSURE: 78 MMHG | BODY MASS INDEX: 34.07 KG/M2 | HEART RATE: 88 BPM | OXYGEN SATURATION: 97 % | HEIGHT: 63 IN | SYSTOLIC BLOOD PRESSURE: 138 MMHG

## 2024-07-29 DIAGNOSIS — Z51.81 ENCOUNTER FOR THERAPEUTIC DRUG MONITORING: ICD-10-CM

## 2024-07-29 DIAGNOSIS — E78.2 MIXED HYPERCHOLESTEROLEMIA AND HYPERTRIGLYCERIDEMIA: ICD-10-CM

## 2024-07-29 DIAGNOSIS — R73.03 PRE-DIABETES: Primary | ICD-10-CM

## 2024-07-29 DIAGNOSIS — E66.9 OBESITY (BMI 30-39.9): ICD-10-CM

## 2024-07-29 PROBLEM — J41.0 SMOKERS' COUGH (HCC): Chronic | Status: ACTIVE | Noted: 2024-07-29

## 2024-07-29 RX ORDER — CITALOPRAM 20 MG/1
20 TABLET ORAL DAILY
COMMUNITY
Start: 2024-07-06

## 2024-07-29 RX ORDER — FLUTICASONE PROPIONATE AND SALMETEROL 250; 50 UG/1; UG/1
1 POWDER RESPIRATORY (INHALATION) 2 TIMES DAILY
COMMUNITY

## 2024-07-29 NOTE — PROGRESS NOTES
[facility]  [address]  [facility] [address]  Dept: [phone]  Loc: [phone]       Patient:  Josee Carmona  :      1955  MRN:      [id number]    Chief Complaint:    Chief Complaint   Patient presents with    Follow - Up    Weight Management       SUBJECTIVE     History of Present Illness:  Josee is being seen today for a follow-up for non surgical weight loss    Past Medical History:   Past Medical History:    Allergic rhinitis    Allergic rhinitis due to animal (cat) (dog) hair and dander    Allergic rhinitis due to other allergen    Allergic rhinitis due to pollen    Anxiety    Asthma (HCC)    ASTHMA NOS W/O STATUS ASTHMATICUS    Calculus of kidney    Chronic sinusitis    CHRONIC SINUSITIS OTHER    Decorative tattoo    Elevated hemoglobin A1c    HgA1C 5.7%    Hypertension    Menopausal symptoms    Nasal polyp    Nontoxic uninodular goiter    Thyroid scan showed a \"hot\" nodule in the left upper lobe    Obesity (BMI 30-39.9)    SHYANNE on CPAP  AHI 25    Other psoriasis    Pre-diabetes    Psoriasis    Sleep apnea, obstructive    AHI 25/ RDI 37/ REM AHI 43/ SaO2 charisse 87%/ CPAP 10 CWP/ Premier    Subclinical hyperthyroidism    Uninodular goiter (nontoxic)    Left upper pole nodule that is \"hot\" on scan in May 2010     Vitamin D deficiency    On ergocalciferol        Comorbidities:  Asthma-Improvement?  yes, SOB/RIDDLE-Improvement?  yes, Hypertension-Improvement?  yes and PATRICIA-Improvement?  yes    OBJECTIVE     Vitals: /78   Pulse 88   Ht 5' 3\" (1.6 m)   Wt 192 lb 4.8 oz (87.2 kg)   SpO2 97%   BMI 34.06 kg/m²     Initial weight loss:00   Total weight loss: +03   Start weight: 189    Wt Readings from Last 3 Encounters:   24 192 lb 4.8 oz (87.2 kg)   23 185 lb (83.9 kg)   22 192 lb 9.6 oz (87.4 kg)       Patient Medications:    Current Outpatient Medications   Medication Sig Dispense Refill    citalopram 20 MG Oral  Tab Take 1 tablet (20 mg total) by mouth daily.      fluticasone-salmeterol 250-50 MCG/ACT Inhalation Aerosol Powder, Breath Activated Inhale 1 puff into the lungs 2 (two) times daily.      VALSARTAN 160 MG Oral Tab TAKE 1 TABLET (160 MG TOTAL) BY MOUTH DAILY 90 tablet 1    PROAIR  (90 Base) MCG/ACT Inhalation Aero Soln INHALE 2 PUFFS BY MOUTH EVERY 4 HOURS AS NEEDED FOR WHEEZE 1 each 3    hydroCHLOROthiazide 12.5 MG Oral Cap Take 1 capsule (12.5 mg total) by mouth daily. 90 capsule 1    FLUTICASONE PROPIONATE 50 MCG/ACT Nasal Suspension SPRAY 2 SPRAYS INTO EACH NOSTRIL EVERY DAY 48 mL 3    EPINEPHrine (EPIPEN 2-LANCE) 0.3 MG/0.3ML Injection Solution Auto-injector Take as directed--mylan generic preferred if available 2 each 0    ALLEGRA 180 MG OR TABS 1 po daily PRN       Allergies:  Mold, Dander, and Macrobid [nitrofurantoin]     Social History:    Social History     Socioeconomic History    Marital status:      Spouse name: Not on file    Number of children: 3    Years of education: Not on file    Highest education level: Not on file   Occupational History    Occupation:    Tobacco Use    Smoking status: Former     Current packs/day: 0.00     Average packs/day: 1.5 packs/day for 14.0 years (20.9 ttl pk-yrs)     Types: Cigarettes     Start date: 1973     Quit date: 1987     Years since quittin.5    Smokeless tobacco: Never   Substance and Sexual Activity    Alcohol use: Yes     Alcohol/week: 4.0 standard drinks of alcohol     Types: 4 Glasses of wine per week     Comment: 5 week    Drug use: No    Sexual activity: Not on file   Other Topics Concern     Service Not Asked    Blood Transfusions Not Asked    Caffeine Concern Yes    Occupational Exposure Not Asked    Hobby Hazards Not Asked    Sleep Concern Not Asked    Stress Concern Not Asked    Weight Concern Not Asked    Special Diet Not Asked    Back Care Not Asked    Exercise Not Asked    Bike Helmet Not Asked     Seat Belt Not Asked    Self-Exams Not Asked   Social History Narrative    Not on file     Social Determinants of Health     Financial Resource Strain: Not on file   Food Insecurity: Not on file   Transportation Needs: Not on file   Physical Activity: Not on file   Stress: Not on file   Social Connections: Unknown (3/13/2021)    Received from [facility]    Social Connections     Conversations with friends/family/neighbors per week: Not on file   Housing Stability: Low Risk  (2021)    Received from [facility]    Housing Stability     Mortgage Payment Concerns?: Not on file     Number of Places Lived in the Last Year: Not on file     Unstable Housing?: Not on file     Surgical History:    Past Surgical History:   Procedure Laterality Date    Brain surgery using computer  10/15/09    Performed by ALECIA DRAKE at [facility]    Colonoscopy      Excision turbinate,submucous  10/15/09    Performed by ALECIA DRAKE at [facility], [facility]    Excision turbinate,submucous  10/15/09    Performed by ALECIA DRAKE at [facility], [facility]    Nasal scopy,explor frontal sinus  10/15/09    Performed by ALECIA DRAKE at [facility], [facility]    Nasal scopy,explor frontal sinus  10/15/09    Performed by ALECIA DRAKE at [facility], [facility]    Nasal scopy,open maxill sinus  10/15/09    Performed by ALECIA DRAKE at [facility], [facility]    Nasal scopy,remv totl ethmoid  10/15/09    Performed by ALECIA DRAKE at [facility], [facility]    Nasal scopy,remv totl ethmoid  10/15/09    Performed by ALECIA DRAKE at [facility], [facility]    Nasal scopy,rmv tiss maxill sinus  10/15/09    Performed by ALECIA DRAKE at [facility], [facility]      ,,1989    x 3    Other surgical history      sinus surgeries     Family History:    Family History   Problem Relation Age of Onset    Heart Disorder Father         CAD with MI    Diabetes Father         Type 2 DM    Obesity  Father     Other (Other) Father     Hypertension Mother     Other (Other) Mother     Diabetes Sister         Type 2 DM    Hypertension Sister     Obesity Sister     Other (Other) Sister     Other (Other) Daughter         Asthma    Hypertension Maternal Grandmother     Hypertension Maternal Grandfather     Thyroid Disorder Neg        Food Journal  Reviewed and Discussed:       Patient has a Food Journal?: yes   Patient is reading nutrition labels?  yes  Average Caloric Intake:     Average CHO Intake: 100  Is patient exercising? yes  Type of exercise? treadmill four days a week  pilates several times a week    Eating Habits  Patient states the following:  Eats 3 meal(s) per day  Length of time it takes to consume a meal:  20  # of snacks per day: 1 Type of snacks:  oranges  Amount of soda consumption per day:  diet  Amount of water (in ounces) per day:  64  Drinking between meals only:  yes  Toughest challenge:      Nutritional Goals  Limit carbohydrates to 100 gms per day, Eat 100-200 calories within 1 hour of waking  and Eat 3-4 cups of fresh fruits or vegetables daily    Behavior Modifications Reviewed and Discussed  Eat breakfast, Eat 3 meals per day, Plan meals in advance, Read nutrition labels, Drink 64 oz of water per day, Maintain a daily food journal, No drinking 30 minutes before or after meals, Utlize portion control strategies to reduce calorie intake, Identify triggers for eating and manage cues and Eat slowly and take 20 to 30 minutes to complete each meal    Exercise Goals Reviewed and Discussed        ROS:    Constitutional: positive for fatigue  Respiratory: positive for dyspnea on exertion  Cardiovascular: negative  Gastrointestinal: negative  Musculoskeletal:negative  Neurological: negative  Behavioral/Psych: positive for stress  Endocrine: negative  All other systems were reviewed and are negative    Physical Exam:   General appearance: alert, appears stated age and cooperative  Head:  Normocephalic, without obvious abnormality, atraumatic  Eyes: conjunctivae/corneas clear. PERRL, EOM's intact. Fundi benign.  Back: symmetric, no curvature. ROM normal. No CVA tenderness.  Lungs: clear to auscultation bilaterally  Heart: S1, S2 normal, no murmur, click, rub or gallop, regular rate and rhythm  Abdomen: soft, non-tender; bowel sounds normal; no masses,  no organomegaly  Extremities: extremities normal, atraumatic, no cyanosis or edema  Pulses: 2+ and symmetric    ASSESSMENT     OBSTRUCTIVE SLEEP APNEA: The patient states her sleep apnea has been stable since the last clinic visit. There has not been any increase in hyper-somnolence.     HYPERCHOLESTEROLEMIA:  The patient states that her cholesterol has been well controlled on her current diet.    Lab Results   Component Value Date/Time    CHOLEST 249.00 (H) 08/27/2021 10:02 AM     (H) 08/27/2021 10:02 AM    HDL 65 08/27/2021 10:02 AM    TRIG 231.00 (H) 08/27/2021 10:02 AM    VLDL 46 (H) 08/27/2021 10:02 AM         Encounter Diagnosis(ses):   Encounter Diagnoses   Name Primary?    Pre-diabetes Yes    Mixed hypercholesterolemia and hypertriglyceridemia     Encounter for therapeutic drug monitoring     Obesity (BMI 30-39.9)        PLAN   No orders of the defined types were placed in this encounter.    Patient is not interested in bariatric surgery. Patient desires to pursue traditional weight loss at this time.      Patient was instructed to continue wearing their CPaP as recommended.     DYSLIPIDEMIA: Stable on the above prescribed meal plan . Liver function stable.    Lab Results   Component Value Date/Time    CHOLEST 249.00 (H) 08/27/2021 10:02 AM     (H) 08/27/2021 10:02 AM    HDL 65 08/27/2021 10:02 AM    TRIG 231.00 (H) 08/27/2021 10:02 AM    VLDL 46 (H) 08/27/2021 10:02 AM       Pre diabetes: continue low carb diet      Goals for next month:  1. Keep a food log.  2. Drink 48-64 ounces of non-caloric beverages per day. No fruit juices  or regular soda.  3. Increase activity-upper body exercises, walk 10 minutes per day.  4. Increase fruit and vegetable servings to 5-6 per day.        PHENTERMINE: did not tolerate  Follow up with RD as scheduled    Did not tolerate Vyvanse    Increased sugar cravings at night  Did not tolerate Topiramate    Will start Ozempic per [facility]    Follow up with pcp as scheduled      Darrin Lozoya MD

## 2024-08-29 ENCOUNTER — TELEPHONE (OUTPATIENT)
Dept: SURGERY | Facility: CLINIC | Age: 69
End: 2024-08-29

## 2024-10-15 ENCOUNTER — TELEPHONE (OUTPATIENT)
Dept: SURGERY | Facility: CLINIC | Age: 69
End: 2024-10-15

## 2024-10-18 ENCOUNTER — TELEMEDICINE (OUTPATIENT)
Dept: SURGERY | Facility: CLINIC | Age: 69
End: 2024-10-18
Payer: MEDICARE

## 2024-10-18 VITALS — HEIGHT: 63 IN | WEIGHT: 183 LBS | BODY MASS INDEX: 32.43 KG/M2

## 2024-10-18 DIAGNOSIS — R73.03 PRE-DIABETES: Primary | ICD-10-CM

## 2024-10-18 DIAGNOSIS — Z51.81 ENCOUNTER FOR THERAPEUTIC DRUG MONITORING: ICD-10-CM

## 2024-10-18 DIAGNOSIS — E78.2 MIXED HYPERCHOLESTEROLEMIA AND HYPERTRIGLYCERIDEMIA: ICD-10-CM

## 2024-10-18 DIAGNOSIS — E66.9 OBESITY (BMI 30-39.9): ICD-10-CM

## 2024-10-18 PROCEDURE — 99214 OFFICE O/P EST MOD 30 MIN: CPT | Performed by: INTERNAL MEDICINE

## 2024-10-18 NOTE — PATIENT INSTRUCTIONS
[facility]  [address]  [place] [address]    Phone  Phone: [phone]    Fax  Fax: [phone]    Hours  Pharmacy: Mon - Fri 7:30AM - 7:30PM    Call Center: Mon - Fri 7:00AM - 7:00PM

## 2024-10-18 NOTE — PROGRESS NOTES
[facility] AND [facility]IGHT LOSS [facility]  [address]  [facility] [address]  Dept: [phone]  Loc: [phone]     Virtual video Check-In    Josee Carmona verbally consents to a Virtual/Telephone Check-In visit on 10/18/24.  Patient has been referred to the [facility] website at [email or web address] to review the yearly Consent to Treat document.    Patient understands and accepts financial responsibility for any deductible, co-insurance and/or co-pays associated with this service.    Duration of the service: 22 minutes      video        Patient:  Josee Carmona  :      1955  MRN:      [id number]    Chief Complaint:    Chief Complaint   Patient presents with    Follow - Up     Non surgical weight loss. Video visit       SUBJECTIVE     History of Present Illness:  Josee is being seen today for a follow-up for non surgical weight loss    Past Medical History:   Past Medical History:    Allergic rhinitis    Allergic rhinitis due to animal (cat) (dog) hair and dander    Allergic rhinitis due to other allergen    Allergic rhinitis due to pollen    Anxiety    Asthma (HCC)    ASTHMA NOS W/O STATUS ASTHMATICUS    Calculus of kidney    Chronic sinusitis    CHRONIC SINUSITIS OTHER    Decorative tattoo    Elevated hemoglobin A1c    HgA1C 5.7%    Hypertension    Menopausal symptoms    Nasal polyp    Nontoxic uninodular goiter    Thyroid scan showed a \"hot\" nodule in the left upper lobe    Obesity (BMI 30-39.9)    SHYANNE on CPAP  AHI 25    Other psoriasis    Pre-diabetes    Psoriasis    Sleep apnea, obstructive    AHI 25/ RDI 37/ REM AHI 43/ SaO2 charisse 87%/ CPAP 10 CWP/ Premier    Subclinical hyperthyroidism    Uninodular goiter (nontoxic)    Left upper pole nodule that is \"hot\" on scan in May 2010     Vitamin D deficiency    On ergocalciferol        Comorbidities:  Asthma-Improvement?  yes, SOB/RIDDLE-Improvement?  yes, Hypertension-Improvement?  yes and PATRICIA-Improvement?   yes    OBJECTIVE     Vitals: Ht 5' 3\" (1.6 m)   Wt 183 lb (83 kg)   BMI 32.42 kg/m²     Initial weight loss:-09   Total weight loss: -06   Start weight: 189    Wt Readings from Last 3 Encounters:   10/18/24 183 lb (83 kg)   24 192 lb 4.8 oz (87.2 kg)   23 185 lb (83.9 kg)       Patient Medications:    Current Outpatient Medications   Medication Sig Dispense Refill    citalopram 20 MG Oral Tab Take 1 tablet (20 mg total) by mouth daily.      fluticasone-salmeterol 250-50 MCG/ACT Inhalation Aerosol Powder, Breath Activated Inhale 1 puff into the lungs 2 (two) times daily.      semaglutide 4 MG/3ML Subcutaneous Solution Pen-injector 25 clicks a week 3 mL 3    VALSARTAN 160 MG Oral Tab TAKE 1 TABLET (160 MG TOTAL) BY MOUTH DAILY 90 tablet 1    PROAIR  (90 Base) MCG/ACT Inhalation Aero Soln INHALE 2 PUFFS BY MOUTH EVERY 4 HOURS AS NEEDED FOR WHEEZE 1 each 3    hydroCHLOROthiazide 12.5 MG Oral Cap Take 1 capsule (12.5 mg total) by mouth daily. 90 capsule 1    FLUTICASONE PROPIONATE 50 MCG/ACT Nasal Suspension SPRAY 2 SPRAYS INTO EACH NOSTRIL EVERY DAY 48 mL 3    EPINEPHrine (EPIPEN 2-LANCE) 0.3 MG/0.3ML Injection Solution Auto-injector Take as directed--mylan generic preferred if available 2 each 0    ALLEGRA 180 MG OR TABS 1 po daily PRN       Allergies:  Mold, Dander, and Macrobid [nitrofurantoin]     Social History:    Social History     Socioeconomic History    Marital status:      Spouse name: Not on file    Number of children: 3    Years of education: Not on file    Highest education level: Not on file   Occupational History    Occupation:    Tobacco Use    Smoking status: Former     Current packs/day: 0.00     Average packs/day: 1.5 packs/day for 14.0 years (20.9 ttl pk-yrs)     Types: Cigarettes     Start date: 1973     Quit date: 1987     Years since quittin.7    Smokeless tobacco: Never   Substance and Sexual Activity    Alcohol use: Yes     Alcohol/week:  4.0 standard drinks of alcohol     Types: 4 Glasses of wine per week     Comment: 5 week    Drug use: No    Sexual activity: Not on file   Other Topics Concern     Service Not Asked    Blood Transfusions Not Asked    Caffeine Concern Yes    Occupational Exposure Not Asked    Hobby Hazards Not Asked    Sleep Concern Not Asked    Stress Concern Not Asked    Weight Concern Not Asked    Special Diet Not Asked    Back Care Not Asked    Exercise Not Asked    Bike Helmet Not Asked    Seat Belt Not Asked    Self-Exams Not Asked   Social History Narrative    Not on file     Social Drivers of Health     Financial Resource Strain: Not on file   Food Insecurity: Not on file   Transportation Needs: Not on file   Physical Activity: Not on file   Stress: Not on file   Social Connections: Unknown (3/13/2021)    Received from [facility]    Social Connections     Conversations with friends/family/neighbors per week: Not on file   Housing Stability: Low Risk  (7/12/2021)    Received from [facility]    Housing Stability     Mortgage Payment Concerns?: Not on file     Number of Places Lived in the Last Year: Not on file     Unstable Housing?: Not on file     Surgical History:    Past Surgical History:   Procedure Laterality Date    Brain surgery using computer  10/15/09    Performed by ALECIA DRAKE at [facility], [facility]    Colonoscopy      Excision turbinate,submucous  10/15/09    Performed by ALECIA DRAKE at [facility], [facility]    Excision turbinate,submucous  10/15/09    Performed by ALECIA DRAKE at [facility], [facility]    Nasal scopy,explor frontal sinus  10/15/09    Performed by ALECIA DRAKE at [facility], [facility]    Nasal scopy,explor frontal sinus  10/15/09    Performed by ALECIA DRAKE at [facility], [facility]    Nasal scopy,open maxill sinus  10/15/09    Performed by ALECIA DRAKE at [facility], [facility]    Nasal scopy,remv totl ethmoid  10/15/09    Performed by  ALECIA DRAKE at [facility]    Nasal scopy,remv totl ethmoid  10/15/09    Performed by ALECIA DRAKE at [facility], [facility]    Nasal scopy,rmv tiss maxill sinus  10/15/09    Performed by ALECIA DRAKE at [facility], [facility]      ,,1989    x 3    Other surgical history      sinus surgeries     Family History:    Family History   Problem Relation Age of Onset    Heart Disorder Father         CAD with MI    Diabetes Father         Type 2 DM    Obesity Father     Other (Other) Father     Hypertension Mother     Other (Other) Mother     Diabetes Sister         Type 2 DM    Hypertension Sister     Obesity Sister     Other (Other) Sister     Other (Other) Daughter         Asthma    Hypertension Maternal Grandmother     Hypertension Maternal Grandfather     Thyroid Disorder Neg        Food Journal  Reviewed and Discussed:       Patient has a Food Journal?: yes   Patient is reading nutrition labels?  yes  Average Caloric Intake:     Average CHO Intake: 100  Is patient exercising? yes  Type of exercise? treadmill four days a week  pilates several times a week    Eating Habits  Patient states the following:  Eats 3 meal(s) per day  Length of time it takes to consume a meal:  20  # of snacks per day: 1 Type of snacks:  oranges  Amount of soda consumption per day:  diet  Amount of water (in ounces) per day:  64  Drinking between meals only:  yes  Toughest challenge:      Nutritional Goals  Limit carbohydrates to 100 gms per day, Eat 100-200 calories within 1 hour of waking  and Eat 3-4 cups of fresh fruits or vegetables daily    Behavior Modifications Reviewed and Discussed  Eat breakfast, Eat 3 meals per day, Plan meals in advance, Read nutrition labels, Drink 64 oz of water per day, Maintain a daily food journal, No drinking 30 minutes before or after meals, Utlize portion control strategies to reduce calorie intake, Identify triggers for eating and manage cues and Eat slowly and take 20 to 30  minutes to complete each meal    Exercise Goals Reviewed and Discussed        ROS:    Constitutional: positive for fatigue  Respiratory: positive for dyspnea on exertion  Cardiovascular: negative  Gastrointestinal: negative  Musculoskeletal:negative  Neurological: negative  Behavioral/Psych: positive for stress  Endocrine: negative  All other systems were reviewed and are negative    Physical Exam:   Limited due to tele visit  Awake and alert  Speaking full sentences    ASSESSMENT     OBSTRUCTIVE SLEEP APNEA: The patient states her sleep apnea has been stable since the last clinic visit. There has not been any increase in hyper-somnolence.     HYPERCHOLESTEROLEMIA:  The patient states that her cholesterol has been well controlled on her current diet.    Lab Results   Component Value Date/Time    CHOLEST 249.00 (H) 08/27/2021 10:02 AM     (H) 08/27/2021 10:02 AM    HDL 65 08/27/2021 10:02 AM    TRIG 231.00 (H) 08/27/2021 10:02 AM    VLDL 46 (H) 08/27/2021 10:02 AM         Encounter Diagnosis(ses):   No diagnosis found.      PLAN   No orders of the defined types were placed in this encounter.    Patient is not interested in bariatric surgery. Patient desires to pursue traditional weight loss at this time.      Patient was instructed to continue wearing their CPaP as recommended.     DYSLIPIDEMIA: Stable on the above prescribed meal plan . Liver function stable.    Lab Results   Component Value Date/Time    CHOLEST 249.00 (H) 08/27/2021 10:02 AM     (H) 08/27/2021 10:02 AM    HDL 65 08/27/2021 10:02 AM    TRIG 231.00 (H) 08/27/2021 10:02 AM    VLDL 46 (H) 08/27/2021 10:02 AM       Pre diabetes: continue low carb diet      Goals for next month:  1. Keep a food log.  2. Drink 48-64 ounces of non-caloric beverages per day. No fruit juices or regular soda.  3. Increase activity-upper body exercises, walk 10 minutes per day.  4. Increase fruit and vegetable servings to 5-6 per day.        PHENTERMINE: did not  tolerate  Follow up with RD as scheduled    Did not tolerate Vyvanse    Increased sugar cravings at night  Did not tolerate Topiramate    Tolerating Ozempic via [facility]    Compound semaglutide is a custom-made medication that mimics the GLP-1 hormone. It is used to treat type 2 diabetes and lower the risk of heart or blood vessel disease. It works by increasing insulin release, lowering glucagon release, delaying gastric emptying and reducing appetite. Compound semaglutide is prescribed when an FDA-approved medication, dose, or dosage form is unavailable (ie. Nationwide shortage or no obesity coverage for GLP-1 meds). Patients are aware of the difference between these medications.  Cost of these medications is  dollars monthly based on dose.    Start at 1 mg    Follow up with pcp as scheduled      Darrin Lozoya MD

## 2025-01-03 ENCOUNTER — OFFICE VISIT (OUTPATIENT)
Dept: SURGERY | Facility: CLINIC | Age: 70
End: 2025-01-03
Payer: MEDICARE

## 2025-01-03 VITALS
OXYGEN SATURATION: 99 % | HEIGHT: 63 IN | WEIGHT: 189 LBS | HEART RATE: 75 BPM | BODY MASS INDEX: 33.49 KG/M2 | SYSTOLIC BLOOD PRESSURE: 100 MMHG | DIASTOLIC BLOOD PRESSURE: 80 MMHG

## 2025-01-03 DIAGNOSIS — E78.2 MIXED HYPERCHOLESTEROLEMIA AND HYPERTRIGLYCERIDEMIA: ICD-10-CM

## 2025-01-03 DIAGNOSIS — R73.03 PRE-DIABETES: Primary | ICD-10-CM

## 2025-01-03 DIAGNOSIS — Z51.81 ENCOUNTER FOR THERAPEUTIC DRUG MONITORING: ICD-10-CM

## 2025-01-03 DIAGNOSIS — E66.9 OBESITY (BMI 30-39.9): ICD-10-CM

## 2025-01-03 PROCEDURE — 99214 OFFICE O/P EST MOD 30 MIN: CPT | Performed by: INTERNAL MEDICINE

## 2025-01-03 NOTE — PROGRESS NOTES
[facility] BARIATRIC AND WEIGHT LOSS CLINIC  [address]  [facility] [address]  Dept: [phone]  Loc: [phone]         Patient:  Josee Carmona  :      1955  MRN:      [id number]    Chief Complaint:    Chief Complaint   Patient presents with    Follow - Up    Weight Management       SUBJECTIVE     History of Present Illness:  Josee is being seen today for a follow-up for non surgical weight loss    Past Medical History:   Past Medical History:    Allergic rhinitis    Allergic rhinitis due to animal (cat) (dog) hair and dander    Allergic rhinitis due to other allergen    Allergic rhinitis due to pollen    Anxiety    Asthma (HCC)    ASTHMA NOS W/O STATUS ASTHMATICUS    Calculus of kidney    Chronic sinusitis    CHRONIC SINUSITIS OTHER    Decorative tattoo    Elevated hemoglobin A1c    HgA1C 5.7%    Hypertension    Menopausal symptoms    Nasal polyp    Nontoxic uninodular goiter    Thyroid scan showed a \"hot\" nodule in the left upper lobe    Obesity (BMI 30-39.9)    SHYANNE on CPAP  AHI 25    Other psoriasis    Pre-diabetes    Psoriasis    Sleep apnea, obstructive    AHI 25/ RDI 37/ REM AHI 43/ SaO2 charisse 87%/ CPAP 10 CWP/ Premier    Subclinical hyperthyroidism    Uninodular goiter (nontoxic)    Left upper pole nodule that is \"hot\" on scan in May 2010     Vitamin D deficiency    On ergocalciferol        Comorbidities:  Asthma-Improvement?  yes, SOB/RIDDLE-Improvement?  yes, Hypertension-Improvement?  yes and PATRICIA-Improvement?  yes    OBJECTIVE     Vitals: /80   Pulse 75   Ht 5' 3\" (1.6 m)   Wt 189 lb (85.7 kg)   SpO2 99%   BMI 33.48 kg/m²     Initial weight loss:+06   Total weight loss:00   Start weight: 189    Wt Readings from Last 3 Encounters:   25 189 lb (85.7 kg)   10/18/24 183 lb (83 kg)   24 192 lb 4.8 oz (87.2 kg)       Patient Medications:    Current Outpatient Medications   Medication Sig Dispense Refill    CUSTOM MEDICATION Semaglutide/  cyanocobalamin    1 mg -   Concentration: 5 mg/ 0.5 mL   Amount: 1 ML vial   Instructions: Injection 0.2 mL/ 20 units sq weekly 1 each 5    citalopram 20 MG Oral Tab Take 1 tablet (20 mg total) by mouth daily.      fluticasone-salmeterol 250-50 MCG/ACT Inhalation Aerosol Powder, Breath Activated Inhale 1 puff into the lungs 2 (two) times daily.      VALSARTAN 160 MG Oral Tab TAKE 1 TABLET (160 MG TOTAL) BY MOUTH DAILY 90 tablet 1    PROAIR  (90 Base) MCG/ACT Inhalation Aero Soln INHALE 2 PUFFS BY MOUTH EVERY 4 HOURS AS NEEDED FOR WHEEZE 1 each 3    hydroCHLOROthiazide 12.5 MG Oral Cap Take 1 capsule (12.5 mg total) by mouth daily. 90 capsule 1    FLUTICASONE PROPIONATE 50 MCG/ACT Nasal Suspension SPRAY 2 SPRAYS INTO EACH NOSTRIL EVERY DAY 48 mL 3    EPINEPHrine (EPIPEN 2-LANCE) 0.3 MG/0.3ML Injection Solution Auto-injector Take as directed--mylan generic preferred if available 2 each 0    ALLEGRA 180 MG OR TABS 1 po daily PRN       Allergies:  Mold, Dander, and Macrobid [nitrofurantoin]     Social History:    Social History     Socioeconomic History    Marital status:      Spouse name: Not on file    Number of children: 3    Years of education: Not on file    Highest education level: Not on file   Occupational History    Occupation:    Tobacco Use    Smoking status: Former     Current packs/day: 0.00     Average packs/day: 1.5 packs/day for 14.0 years (20.9 ttl pk-yrs)     Types: Cigarettes     Start date: 1973     Quit date: 1987     Years since quittin.0    Smokeless tobacco: Never   Substance and Sexual Activity    Alcohol use: Yes     Alcohol/week: 4.0 standard drinks of alcohol     Types: 4 Glasses of wine per week     Comment: 5 week    Drug use: No    Sexual activity: Not on file   Other Topics Concern     Service Not Asked    Blood Transfusions Not Asked    Caffeine Concern Yes    Occupational Exposure Not Asked    Hobby Hazards Not Asked    Sleep Concern Not  Asked    Stress Concern Not Asked    Weight Concern Not Asked    Special Diet Not Asked    Back Care Not Asked    Exercise Not Asked    Bike Helmet Not Asked    Seat Belt Not Asked    Self-Exams Not Asked   Social History Narrative    Not on file     Social Drivers of Health     Financial Resource Strain: Not on file   Food Insecurity: Not on file   Transportation Needs: Not on file   Physical Activity: Not on file   Stress: Not on file   Social Connections: Unknown (3/13/2021)    Received from [facility]    Social Connections     Conversations with friends/family/neighbors per week: Not on file   Housing Stability: Low Risk  (2021)    Received from [facility]    Housing Stability     Mortgage Payment Concerns?: Not on file     Number of Places Lived in the Last Year: Not on file     Unstable Housing?: Not on file     Surgical History:    Past Surgical History:   Procedure Laterality Date    Brain surgery using computer  10/15/09    Performed by ALECIA DRAKE at [facility]    Colonoscopy      Excision turbinate,submucous  10/15/09    Performed by ALECIA DRAKE at [facility], [facility]    Excision turbinate,submucous  10/15/09    Performed by ALECIA DRAKE at [facility], [facility]    Nasal scopy,explor frontal sinus  10/15/09    Performed by ALECIA DRAKE at [facility], [facility]    Nasal scopy,explor frontal sinus  10/15/09    Performed by ALECIA DRAKE at [facility], [facility]    Nasal scopy,open maxill sinus  10/15/09    Performed by ALECIA DRAKE at [facility]    Nasal scopy,remv totl ethmoid  10/15/09    Performed by ALECIA DRAKE at [facility]    Nasal scopy,remv totl ethmoid  10/15/09    Performed by ALECIA DRAKE at [facility]    Nasal scopy,rmv tiss maxill sinus  10/15/09    Performed by ALECIA DRAKE at [facility], [facility]      ,,1989    x 3    Other surgical history      sinus surgeries      Family History:    Family History   Problem Relation Age of Onset    Heart Disorder Father         CAD with MI    Diabetes Father         Type 2 DM    Obesity Father     Other (Other) Father     Hypertension Mother     Other (Other) Mother     Diabetes Sister         Type 2 DM    Hypertension Sister     Obesity Sister     Other (Other) Sister     Other (Other) Daughter         Asthma    Hypertension Maternal Grandmother     Hypertension Maternal Grandfather     Thyroid Disorder Neg        Food Journal  Reviewed and Discussed:       Patient has a Food Journal?: yes   Patient is reading nutrition labels?  yes  Average Caloric Intake:     Average CHO Intake: 100  Is patient exercising? yes  Type of exercise? treadmill four days a week  pilates several times a week    Eating Habits  Patient states the following:  Eats 3 meal(s) per day  Length of time it takes to consume a meal:  20  # of snacks per day: 1 Type of snacks:  oranges  Amount of soda consumption per day:  diet  Amount of water (in ounces) per day:  64  Drinking between meals only:  yes  Toughest challenge:      Nutritional Goals  Limit carbohydrates to 100 gms per day, Eat 100-200 calories within 1 hour of waking  and Eat 3-4 cups of fresh fruits or vegetables daily    Behavior Modifications Reviewed and Discussed  Eat breakfast, Eat 3 meals per day, Plan meals in advance, Read nutrition labels, Drink 64 oz of water per day, Maintain a daily food journal, No drinking 30 minutes before or after meals, Utlize portion control strategies to reduce calorie intake, Identify triggers for eating and manage cues and Eat slowly and take 20 to 30 minutes to complete each meal    Exercise Goals Reviewed and Discussed        ROS:    Constitutional: positive for fatigue  Respiratory: positive for dyspnea on exertion  Cardiovascular: negative  Gastrointestinal: negative  Musculoskeletal:negative  Neurological: negative  Behavioral/Psych: positive for  stress  Endocrine: negative  All other systems were reviewed and are negative    Physical Exam:   General appearance: alert, appears stated age and cooperative  Head: Normocephalic, without obvious abnormality, atraumatic  Eyes: conjunctivae/corneas clear. PERRL, EOM's intact. Fundi benign.  Back: symmetric, no curvature. ROM normal. No CVA tenderness.  Lungs: clear to auscultation bilaterally  Heart: S1, S2 normal, no murmur, click, rub or gallop, regular rate and rhythm  Abdomen: soft, non-tender; bowel sounds normal; no masses,  no organomegaly  Extremities: extremities normal, atraumatic, no cyanosis or edema  Pulses: 2+ and symmetric      ASSESSMENT     OBSTRUCTIVE SLEEP APNEA: The patient states her sleep apnea has been stable since the last clinic visit. There has not been any increase in hyper-somnolence.     HYPERCHOLESTEROLEMIA:  The patient states that her cholesterol has been well controlled on her current diet.    Lab Results   Component Value Date/Time    CHOLEST 249.00 (H) 08/27/2021 10:02 AM     (H) 08/27/2021 10:02 AM    HDL 65 08/27/2021 10:02 AM    TRIG 231.00 (H) 08/27/2021 10:02 AM    VLDL 46 (H) 08/27/2021 10:02 AM         Encounter Diagnosis(ses):   Encounter Diagnoses   Name Primary?    Pre-diabetes Yes    Mixed hypercholesterolemia and hypertriglyceridemia     Encounter for therapeutic drug monitoring     Obesity (BMI 30-39.9)          PLAN   No orders of the defined types were placed in this encounter.    Patient is not interested in bariatric surgery. Patient desires to pursue traditional weight loss at this time.      Patient was instructed to continue wearing their CPaP as recommended.     DYSLIPIDEMIA: Stable on the above prescribed meal plan . Liver function stable.    Lab Results   Component Value Date/Time    CHOLEST 249.00 (H) 08/27/2021 10:02 AM     (H) 08/27/2021 10:02 AM    HDL 65 08/27/2021 10:02 AM    TRIG 231.00 (H) 08/27/2021 10:02 AM    VLDL 46 (H) 08/27/2021  10:02 AM       Pre diabetes: continue low carb diet      Goals for next month:  1. Keep a food log.  2. Drink 48-64 ounces of non-caloric beverages per day. No fruit juices or regular soda.  3. Increase activity-upper body exercises, walk 10 minutes per day.  4. Increase fruit and vegetable servings to 5-6 per day.        PHENTERMINE: did not tolerate  Follow up with RD as scheduled    Did not tolerate Vyvanse    Increased sugar cravings at night  Did not tolerate Topiramate      Compound semaglutide not helping with appetite  Switch to Tirzepatide     Follow up with pcp as scheduled      Darrin Lozoya MD

## 2025-06-10 ENCOUNTER — OFFICE VISIT (OUTPATIENT)
Dept: SURGERY | Facility: CLINIC | Age: 70
End: 2025-06-10
Payer: MEDICARE

## 2025-06-10 VITALS
WEIGHT: 180 LBS | HEIGHT: 63 IN | RESPIRATION RATE: 16 BRPM | DIASTOLIC BLOOD PRESSURE: 72 MMHG | HEART RATE: 74 BPM | BODY MASS INDEX: 31.89 KG/M2 | SYSTOLIC BLOOD PRESSURE: 102 MMHG | OXYGEN SATURATION: 98 %

## 2025-06-10 DIAGNOSIS — Z51.81 ENCOUNTER FOR THERAPEUTIC DRUG MONITORING: ICD-10-CM

## 2025-06-10 DIAGNOSIS — R73.03 PRE-DIABETES: Primary | ICD-10-CM

## 2025-06-10 DIAGNOSIS — E78.2 MIXED HYPERCHOLESTEROLEMIA AND HYPERTRIGLYCERIDEMIA: ICD-10-CM

## 2025-06-10 DIAGNOSIS — E66.9 OBESITY (BMI 30-39.9): ICD-10-CM

## 2025-06-10 DIAGNOSIS — G47.33 OSA (OBSTRUCTIVE SLEEP APNEA): ICD-10-CM

## 2025-06-10 PROCEDURE — 99214 OFFICE O/P EST MOD 30 MIN: CPT | Performed by: INTERNAL MEDICINE

## 2025-06-10 RX ORDER — TIRZEPATIDE 2.5 MG/.5ML
2.5 INJECTION, SOLUTION SUBCUTANEOUS WEEKLY
Qty: 3 ML | Refills: 2 | Status: SHIPPED | OUTPATIENT
Start: 2025-06-10

## 2025-06-10 NOTE — PROGRESS NOTES
[facility] BARIATRIC AND WEIGHT LOSS CLINIC  [address]  [facility] [address]  Dept: [phone]  Loc: [phone]         Patient:  Josee Carmona  :      1955  MRN:      [id number]    Chief Complaint:    Chief Complaint   Patient presents with    Follow - Up    Weight Management       SUBJECTIVE     History of Present Illness:  Josee is being seen today for a follow-up for non surgical weight loss    Past Medical History:   Past Medical History:    Allergic rhinitis    Allergic rhinitis due to animal (cat) (dog) hair and dander    Allergic rhinitis due to other allergen    Allergic rhinitis due to pollen    Anxiety    Asthma (HCC)    ASTHMA NOS W/O STATUS ASTHMATICUS    Calculus of kidney    Chronic sinusitis    CHRONIC SINUSITIS OTHER    Decorative tattoo    Elevated hemoglobin A1c    HgA1C 5.7%    Hypertension    Menopausal symptoms    Nasal polyp    Nontoxic uninodular goiter    Thyroid scan showed a \"hot\" nodule in the left upper lobe    Obesity (BMI 30-39.9)    SHYANNE on CPAP  AHI 25    Other psoriasis    Pre-diabetes    Psoriasis    Sleep apnea, obstructive    AHI 25/ RDI 37/ REM AHI 43/ SaO2 charisse 87%/ CPAP 10 CWP/ Premier    Subclinical hyperthyroidism    Uninodular goiter (nontoxic)    Left upper pole nodule that is \"hot\" on scan in May 2010     Vitamin D deficiency    On ergocalciferol        Comorbidities:  Asthma-Improvement?  yes, SOB/RIDDLE-Improvement?  yes, Hypertension-Improvement?  yes and PATRICIA-Improvement?  yes    OBJECTIVE     Vitals: /72   Pulse 74   Resp 16   Ht 5' 3\" (1.6 m)   Wt 180 lb (81.6 kg)   SpO2 98%   BMI 31.89 kg/m²     Initial weight loss:+06   Total weight loss:00   Start weight: 189    Wt Readings from Last 3 Encounters:   06/10/25 180 lb (81.6 kg)   25 189 lb (85.7 kg)   10/18/24 183 lb (83 kg)       Patient Medications:    Current Outpatient Medications   Medication Sig Dispense Refill    CUSTOM MEDICATION  Tirzepatide/Niacinamide Injection    8/2 mg/mL 2.5mL        Inject  5   mg subcutaneously into abdomen weekly 1 each 5    citalopram 20 MG Oral Tab Take 1 tablet (20 mg total) by mouth daily.      fluticasone-salmeterol 250-50 MCG/ACT Inhalation Aerosol Powder, Breath Activated Inhale 1 puff into the lungs 2 (two) times daily.      VALSARTAN 160 MG Oral Tab TAKE 1 TABLET (160 MG TOTAL) BY MOUTH DAILY 90 tablet 1    PROAIR  (90 Base) MCG/ACT Inhalation Aero Soln INHALE 2 PUFFS BY MOUTH EVERY 4 HOURS AS NEEDED FOR WHEEZE 1 each 3    hydroCHLOROthiazide 12.5 MG Oral Cap Take 1 capsule (12.5 mg total) by mouth daily. 90 capsule 1    FLUTICASONE PROPIONATE 50 MCG/ACT Nasal Suspension SPRAY 2 SPRAYS INTO EACH NOSTRIL EVERY DAY 48 mL 3    EPINEPHrine (EPIPEN 2-LANCE) 0.3 MG/0.3ML Injection Solution Auto-injector Take as directed--mylan generic preferred if available 2 each 0    ALLEGRA 180 MG OR TABS 1 po daily PRN       Allergies:  Mold, Dander, and Macrobid [nitrofurantoin]     Social History:    Social History     Socioeconomic History    Marital status:      Spouse name: Not on file    Number of children: 3    Years of education: Not on file    Highest education level: Not on file   Occupational History    Occupation:    Tobacco Use    Smoking status: Former     Current packs/day: 0.00     Average packs/day: 1.5 packs/day for 14.0 years (20.9 ttl pk-yrs)     Types: Cigarettes     Start date: 1973     Quit date: 1987     Years since quittin.4    Smokeless tobacco: Never   Substance and Sexual Activity    Alcohol use: Yes     Alcohol/week: 4.0 standard drinks of alcohol     Types: 4 Glasses of wine per week     Comment: 5 week    Drug use: No    Sexual activity: Not on file   Other Topics Concern     Service Not Asked    Blood Transfusions Not Asked    Caffeine Concern Yes    Occupational Exposure Not Asked    Hobby Hazards Not Asked    Sleep Concern Not Asked    Stress  Concern Not Asked    Weight Concern Not Asked    Special Diet Not Asked    Back Care Not Asked    Exercise Not Asked    Bike Helmet Not Asked    Seat Belt Not Asked    Self-Exams Not Asked   Social History Narrative    Not on file     Social Drivers of Health     Food Insecurity: Not on file   Transportation Needs: Not on file   Stress: Not on file   Housing Stability: Low Risk  (2021)    Received from [facility]    Housing Stability     Mortgage Payment Concerns?: Not on file     Number of Places Lived in the Last Year: Not on file     Unstable Housing?: Not on file     Surgical History:    Past Surgical History:   Procedure Laterality Date    Brain surgery using computer  10/15/09    Performed by ALECIA DRAKE at [facility]    Colonoscopy      Excision turbinate,submucous  10/15/09    Performed by ALECIA DRAKE at [facility], [facility]    Excision turbinate,submucous  10/15/09    Performed by ALECIA DRAKE at [facility], [facility]    Nasal scopy,explor frontal sinus  10/15/09    Performed by ALECIA DRAKE at [facility], [facility]    Nasal scopy,explor frontal sinus  10/15/09    Performed by ALECIA DRAKE at [facility], [facility]    Nasal scopy,open maxill sinus  10/15/09    Performed by ALECIA DRAKE at [facility], [facility]    Nasal scopy,remv totl ethmoid  10/15/09    Performed by ALECIA DRAKE at [facility], [facility]    Nasal scopy,remv totl ethmoid  10/15/09    Performed by ALECIA DRAKE at [facility], [facility]    Nasal scopy,rmv tiss maxill sinus  10/15/09    Performed by ALECIA DRAKE at [facility], [facility]      ,,1989    x 3    Other surgical history      sinus surgeries     Family History:    Family History   Problem Relation Age of Onset    Heart Disorder Father         CAD with MI    Diabetes Father         Type 2 DM    Obesity Father     Other (Other) Father     Hypertension Mother     Other (Other) Mother     Diabetes Sister         Type 2 DM     Hypertension Sister     Obesity Sister     Other (Other) Sister     Other (Other) Daughter         Asthma    Hypertension Maternal Grandmother     Hypertension Maternal Grandfather     Thyroid Disorder Neg        Food Journal  Reviewed and Discussed:       Patient has a Food Journal?: yes   Patient is reading nutrition labels?  yes  Average Caloric Intake:     Average CHO Intake: 100  Is patient exercising? yes  Type of exercise? treadmill four days a week  pilates several times a week    Eating Habits  Patient states the following:  Eats 3 meal(s) per day  Length of time it takes to consume a meal:  20  # of snacks per day: 1 Type of snacks:  oranges  Amount of soda consumption per day:  diet  Amount of water (in ounces) per day:  64  Drinking between meals only:  yes  Toughest challenge:      Nutritional Goals  Limit carbohydrates to 100 gms per day, Eat 100-200 calories within 1 hour of waking  and Eat 3-4 cups of fresh fruits or vegetables daily    Behavior Modifications Reviewed and Discussed  Eat breakfast, Eat 3 meals per day, Plan meals in advance, Read nutrition labels, Drink 64 oz of water per day, Maintain a daily food journal, No drinking 30 minutes before or after meals, Utlize portion control strategies to reduce calorie intake, Identify triggers for eating and manage cues and Eat slowly and take 20 to 30 minutes to complete each meal    Exercise Goals Reviewed and Discussed        ROS:    Constitutional: positive for fatigue  Respiratory: positive for dyspnea on exertion  Cardiovascular: negative  Gastrointestinal: negative  Musculoskeletal:negative  Neurological: negative  Behavioral/Psych: positive for stress  Endocrine: negative  All other systems were reviewed and are negative    Physical Exam:   General appearance: alert, appears stated age and cooperative  Head: Normocephalic, without obvious abnormality, atraumatic  Eyes: conjunctivae/corneas clear. PERRL, EOM's intact. Fundi benign.  Back:  symmetric, no curvature. ROM normal. No CVA tenderness.  Lungs: clear to auscultation bilaterally  Heart: S1, S2 normal, no murmur, click, rub or gallop, regular rate and rhythm  Abdomen: soft, non-tender; bowel sounds normal; no masses,  no organomegaly  Extremities: extremities normal, atraumatic, no cyanosis or edema  Pulses: 2+ and symmetric      ASSESSMENT     OBSTRUCTIVE SLEEP APNEA: The patient states her sleep apnea has been stable since the last clinic visit. There has not been any increase in hyper-somnolence.     HYPERCHOLESTEROLEMIA:  The patient states that her cholesterol has been well controlled on her current diet.    Lab Results   Component Value Date/Time    CHOLEST 249.00 (H) 08/27/2021 10:02 AM     (H) 08/27/2021 10:02 AM    HDL 65 08/27/2021 10:02 AM    TRIG 231.00 (H) 08/27/2021 10:02 AM    VLDL 46 (H) 08/27/2021 10:02 AM         Encounter Diagnosis(ses):   Encounter Diagnoses   Name Primary?    Pre-diabetes Yes    Mixed hypercholesterolemia and hypertriglyceridemia     Encounter for therapeutic drug monitoring     Obesity (BMI 30-39.9)          PLAN   No orders of the defined types were placed in this encounter.    Patient is not interested in bariatric surgery. Patient desires to pursue traditional weight loss at this time.      Patient was instructed to continue wearing their CPaP as recommended.     DYSLIPIDEMIA: Stable on the above prescribed meal plan . Liver function stable.    Lab Results   Component Value Date/Time    CHOLEST 249.00 (H) 08/27/2021 10:02 AM     (H) 08/27/2021 10:02 AM    HDL 65 08/27/2021 10:02 AM    TRIG 231.00 (H) 08/27/2021 10:02 AM    VLDL 46 (H) 08/27/2021 10:02 AM       Pre diabetes: continue low carb diet      Goals for next month:  1. Keep a food log.  2. Drink 48-64 ounces of non-caloric beverages per day. No fruit juices or regular soda.  3. Increase activity-upper body exercises, walk 10 minutes per day.  4. Increase fruit and vegetable servings  to 5-6 per day.        PHENTERMINE: did not tolerate  Follow up with RD as scheduled    Did not tolerate Vyvanse    Increased sugar cravings at night  Did not tolerate Topiramate      Zepbound: may help with her SHYANNE    Follow up with pcp as scheduled      Darrin Lozoya MD

## (undated) NOTE — MR AVS SNAPSHOT
[facility] [address] [place] [id number] [phone]               Thank you for choosing us for your health care visit with Duke Holter, MD.  We are glad to serve you and happy to provide you with this summary of your v Generic drug:  Fexofenadine HCl   1 po daily PRN           Apremilast 30 MG Tabs   1 tab po BID   Commonly known as:  OTEZLA           Citalopram Hydrobromide 20 MG Tabs   TAKE 1 TABLET BY MOUTH ONCE DAILY.    Commonly known as:  CeleXA           EPINEPHrin office, you can view your past visit information in [employer] by going to Visits < Visit Summaries. [employer] questions? Call [phone] for help. [employer] is NOT to be used for urgent needs. For medical emergencies, dial 911.            Visit [facility]

## (undated) NOTE — MR AVS SNAPSHOT
[address] [facility]  [address] [phone]               Thank you for choosing us for your health care visit with Helen Dean MD.  We are glad to serve you and happy to provide you with th 2 sprays by Each Nare route daily. Commonly known as:  FLONASE           Irbesartan-Hydrochlorothiazide 150-12.5 MG Tabs   Take 1 tablet by mouth daily.            * Lisdexamfetamine Dimesylate 50 MG Caps   Take 1 capsule (50 mg total) by mouth every morn You can access your MyChart to more actively manage your health care and view more details from this visit by going to [email or web address].   If you've recently had a stay at the Hospital you can access your discharge instructions in [address] by lesly

## (undated) NOTE — MR AVS SNAPSHOT
[facility] [employer] [place] [address] [phone]               Thank you for choosing us for your health care visit with Zeferino Plunkett MD.  We are glad to serve you and happy to provide you with this summary of your v 2 sprays by Nasal route daily. Per nostril   Commonly known as:  QNASL           Citalopram Hydrobromide 20 MG Tabs   TAKE 1 TABLET BY MOUTH ONCE DAILY. Commonly known as:  CeleXA           EPINEPHrine 0.3 MG/0.3ML Nayeli   Inject  as directed. For [employer]. Call [phone] for help. MyChart is NOT to be used for urgent needs. For medical emergencies, dial 911. Educational Information     Your blood pressure indicates you may be at-risk for Hypertension.    Please consider the following Lifestyle M

## (undated) NOTE — MR AVS SNAPSHOT
[address] [phone]               Thank you for choosing us for your health care visit with Stas Gil MD.  We are glad to serve you and happy to provide you with this summary of your v ALLEGRA 180 MG Tabs   Generic drug:  Fexofenadine HCl   1 po daily PRN           Beclomethasone Dipropionate 80 MCG/ACT Aers   2 sprays by Nasal route daily.  Per nostril   Commonly known as:  QNASL           Citalopram Hydrobromide 20 MG Tabs   TAKE 1 TAB [employer] questions? Call [phone] for help. [employer] is NOT to be used for urgent needs. For medical emergencies, dial 911.            Visit [facility] online at  [email or web address]

## (undated) NOTE — MR AVS SNAPSHOT
[facility] [email or web address] [place] [id number] [phone]               Thank you for choosing us for your health care visit with Anuradha Valente MD.  We are glad to serve you and happy to provide you with this summary of your v 139/96 mmHg 90 5' 3.5\" (1.613 m) 177 lb 8 oz 30.95 kg/m2 No         Current Medications          This list is accurate as of: 5/2/17  2:54 PM.  Always use your most recent med list.                Rhoda Scott   as directed           ALLEGRA 180 Bring a paper prescription for each of these medications    - Lisdexamfetamine Dimesylate 50 MG Caps            MyChart     Visit MyChart  You can access your MyChart to more actively manage your health care and view more details from this visit by going